# Patient Record
Sex: FEMALE | Race: WHITE | NOT HISPANIC OR LATINO
[De-identification: names, ages, dates, MRNs, and addresses within clinical notes are randomized per-mention and may not be internally consistent; named-entity substitution may affect disease eponyms.]

---

## 2017-07-20 ENCOUNTER — RESULT REVIEW (OUTPATIENT)
Age: 37
End: 2017-07-20

## 2018-07-23 ENCOUNTER — RESULT REVIEW (OUTPATIENT)
Age: 38
End: 2018-07-23

## 2018-11-24 ENCOUNTER — TRANSCRIPTION ENCOUNTER (OUTPATIENT)
Age: 38
End: 2018-11-24

## 2019-07-01 ENCOUNTER — RECORD ABSTRACTING (OUTPATIENT)
Age: 39
End: 2019-07-01

## 2019-07-01 ENCOUNTER — APPOINTMENT (OUTPATIENT)
Dept: OBGYN | Facility: CLINIC | Age: 39
End: 2019-07-01
Payer: COMMERCIAL

## 2019-07-01 VITALS
BODY MASS INDEX: 21.26 KG/M2 | WEIGHT: 120 LBS | HEIGHT: 63 IN | SYSTOLIC BLOOD PRESSURE: 104 MMHG | DIASTOLIC BLOOD PRESSURE: 62 MMHG

## 2019-07-01 DIAGNOSIS — Z80.8 FAMILY HISTORY OF MALIGNANT NEOPLASM OF OTHER ORGANS OR SYSTEMS: ICD-10-CM

## 2019-07-01 DIAGNOSIS — Z82.62 FAMILY HISTORY OF OSTEOPOROSIS: ICD-10-CM

## 2019-07-01 DIAGNOSIS — F41.9 ANXIETY DISORDER, UNSPECIFIED: ICD-10-CM

## 2019-07-01 DIAGNOSIS — Z86.32 PERSONAL HISTORY OF GESTATIONAL DIABETES: ICD-10-CM

## 2019-07-01 LAB
BILIRUB UR QL STRIP: NORMAL
CYTOLOGY CVX/VAG DOC THIN PREP: NORMAL
GLUCOSE UR-MCNC: NORMAL
HCG UR QL: 0.2 EU/DL
HCG UR QL: NEGATIVE
HGB UR QL STRIP.AUTO: NORMAL
KETONES UR-MCNC: NORMAL
LEUKOCYTE ESTERASE UR QL STRIP: NORMAL
NITRITE UR QL STRIP: NORMAL
PH UR STRIP: 5
PROT UR STRIP-MCNC: NORMAL
QUALITY CONTROL: YES
SP GR UR STRIP: 1.03

## 2019-07-01 PROCEDURE — 99213 OFFICE O/P EST LOW 20 MIN: CPT

## 2019-07-01 PROCEDURE — 81003 URINALYSIS AUTO W/O SCOPE: CPT | Mod: QW

## 2019-07-01 PROCEDURE — 81025 URINE PREGNANCY TEST: CPT

## 2019-07-01 NOTE — HISTORY OF PRESENT ILLNESS
[1 Year Ago] : 1 year ago [Good] : being in good health [Healthy Diet] : a healthy diet [Regular Exercise] : regular exercise [Last Pap ___] : Last cervical pap smear was [unfilled] [Reproductive Age] : is of reproductive age [Last Mammogram ___] : Last Mammogram was [unfilled] [Contraception] : uses contraception [Pregnancy History] : pregnancy history: [Definite:  ___ (Date)] : the last menstrual period was [unfilled] [Sexually Active] : is sexually active [Male ___] : [unfilled] male [No] : no [Weight Concerns] : no concerns with her weight [Menstrual Problems] : reports normal menses

## 2019-08-02 ENCOUNTER — TRANSCRIPTION ENCOUNTER (OUTPATIENT)
Age: 39
End: 2019-08-02

## 2019-08-26 ENCOUNTER — APPOINTMENT (OUTPATIENT)
Dept: OBGYN | Facility: CLINIC | Age: 39
End: 2019-08-26
Payer: COMMERCIAL

## 2019-08-26 VITALS
HEIGHT: 63 IN | SYSTOLIC BLOOD PRESSURE: 110 MMHG | BODY MASS INDEX: 21.26 KG/M2 | DIASTOLIC BLOOD PRESSURE: 70 MMHG | WEIGHT: 120 LBS

## 2019-08-26 DIAGNOSIS — Z80.3 FAMILY HISTORY OF MALIGNANT NEOPLASM OF BREAST: ICD-10-CM

## 2019-08-26 DIAGNOSIS — Z80.1 FAMILY HISTORY OF MALIGNANT NEOPLASM OF TRACHEA, BRONCHUS AND LUNG: ICD-10-CM

## 2019-08-26 DIAGNOSIS — Z80.0 FAMILY HISTORY OF MALIGNANT NEOPLASM OF DIGESTIVE ORGANS: ICD-10-CM

## 2019-08-26 PROCEDURE — 99395 PREV VISIT EST AGE 18-39: CPT

## 2019-08-26 NOTE — DISCUSSION/SUMMARY
[FreeTextEntry1] : 1. continue f/u with breast MD.  she will fwd records\par 2. recommended pelvic floor exercises, pelvic PT resources given\par 3. monitor menses, if become very heavy/problematic- return for further evaluation.

## 2019-08-26 NOTE — HISTORY OF PRESENT ILLNESS
[Last Pap ___] : Last cervical pap smear was [unfilled] [Definite:  ___ (Date)] : the last menstrual period was [unfilled] [Menarche Age: ____] : age at menarche was [unfilled] [Regular Cycle Intervals] : periods have been regular [Sexually Active] : is sexually active [Contraception] : uses contraception [Male ___] : [unfilled] male [Partner Vasectomy] : has a partner with a vasectomy

## 2019-08-26 NOTE — PHYSICAL EXAM
[Awake] : awake [Alert] : alert [Soft] : soft [Oriented x3] : oriented to person, place, and time [Normal] : uterus [Uterine Prolapse] : uterine prolapse [Uterine Adnexae] : were not tender and not enlarged [Mass] : no breast mass [Nipple Discharge] : no nipple discharge [Axillary LAD] : no axillary lymphadenopathy [Tender] : non tender [Distended] : not distended [Tenderness] : nontender [Enlarged ___ wks] : not enlarged [Mass ___ cm] : no uterine mass was palpated

## 2019-08-28 LAB
C TRACH RRNA SPEC QL NAA+PROBE: NOT DETECTED
HPV HIGH+LOW RISK DNA PNL CVX: NOT DETECTED
N GONORRHOEA RRNA SPEC QL NAA+PROBE: NOT DETECTED
SOURCE AMPLIFICATION: NORMAL
SOURCE TP AMPLIFICATION: NORMAL
T VAGINALIS RRNA SPEC QL NAA+PROBE: NOT DETECTED

## 2019-08-29 LAB — CYTOLOGY CVX/VAG DOC THIN PREP: ABNORMAL

## 2020-12-10 ENCOUNTER — RESULT REVIEW (OUTPATIENT)
Age: 40
End: 2020-12-10

## 2020-12-31 ENCOUNTER — APPOINTMENT (OUTPATIENT)
Dept: OBGYN | Facility: CLINIC | Age: 40
End: 2020-12-31

## 2022-04-25 ENCOUNTER — ASOB RESULT (OUTPATIENT)
Age: 42
End: 2022-04-25

## 2022-04-25 ENCOUNTER — APPOINTMENT (OUTPATIENT)
Dept: OBGYN | Facility: CLINIC | Age: 42
End: 2022-04-25
Payer: COMMERCIAL

## 2022-04-25 ENCOUNTER — APPOINTMENT (OUTPATIENT)
Dept: ANTEPARTUM | Facility: CLINIC | Age: 42
End: 2022-04-25
Payer: COMMERCIAL

## 2022-04-25 VITALS
HEIGHT: 63 IN | BODY MASS INDEX: 21.26 KG/M2 | DIASTOLIC BLOOD PRESSURE: 74 MMHG | WEIGHT: 120 LBS | SYSTOLIC BLOOD PRESSURE: 118 MMHG

## 2022-04-25 DIAGNOSIS — N90.7 VULVAR CYST: ICD-10-CM

## 2022-04-25 PROCEDURE — 99214 OFFICE O/P EST MOD 30 MIN: CPT

## 2022-04-25 PROCEDURE — 76830 TRANSVAGINAL US NON-OB: CPT

## 2022-04-29 PROBLEM — N90.7 VULVAR CYST: Status: RESOLVED | Noted: 2019-07-01 | Resolved: 2022-04-29

## 2022-04-29 NOTE — DISCUSSION/SUMMARY
[FreeTextEntry1] : We discussed the possibility of a physiologic cyst with possible intermittent ovarian torsion while she was in [place]. Reassured patient that this cyst is not suspicious of cancer in regards to both sonograms performed both in the ER and at our office.We discussed the signs and symptoms of ovarian torsion and she is aware that if she developed any symptoms she would need to go to the nearest ER. She was instructed to avoid exercise and sex.\par \par Advised patient we will repeat another transvaginal sonogram in 6 weeks. Prescription for a transvaginal sonogram given. \par \par We discussed the risks of ovarian torsion and to call the office if having severe pain. We also discussed symptoms of ovarian torsion. Patient is aware to avoid strenuous exercise and sexual intercourse for the next six weeks.\par \par All questions and concerns were discussed.\par \par She will follow up in 6 weeks for repeat pelvic sonogram and annual exam and as needed. \par \par During this visit 30 minutes were spent face-to-face with greater than 50% of the time dedicated\par to counseling.

## 2022-04-29 NOTE — HISTORY OF PRESENT ILLNESS
[N] : Patient reports normal menses [Vasectomy (partner)] : has a partner with a vasectomy [Y] : Positive pregnancy history [Menarche Age: ____] : age at menarche was [unfilled] [No] : Patient does not have concerns regarding sex [Currently Active] : currently active [PapSmeardate] : 08/26/19 [TextBox_31] : NEG [GonorrheaDate] : 08/26/19 [TextBox_63] : NEG [ChlamydiaDate] : 08/26/19 [TextBox_68] : NEG [TrichomonasDate] : 08/26/19 [TextBox_73] : NEG [HPVDate] : 08/26/19 [TextBox_78] : NEG [LMPDate] : 04/21/22 [PGHxTotal] : 4 [[facility]] : 4 [[facility]] : 4 [FreeTextEntry1] : 04/21/22

## 2022-04-29 NOTE — END OF VISIT
[FreeTextEntry3] : I, Dakotah Deutsch solely acted as scribe for Dr. Shirlene Porter on 04/25/2022 \par All medical entries made by the Scribe were at my, Dr. Porter’s, direction and personally\par dictated by me on 04/25/2022 . I have reviewed the chart and agree that the record\par accurately reflects my personal performance of the history, physical exam, assessment and plan. I\par have also personally directed, reviewed, and agreed with the chart. [Time Spent: ___ minutes] : I have spent [unfilled] minutes of time on the encounter.

## 2022-05-09 ENCOUNTER — NON-APPOINTMENT (OUTPATIENT)
Age: 42
End: 2022-05-09

## 2022-06-06 ENCOUNTER — APPOINTMENT (OUTPATIENT)
Dept: OBGYN | Facility: CLINIC | Age: 42
End: 2022-06-06

## 2022-06-06 ENCOUNTER — ASOB RESULT (OUTPATIENT)
Age: 42
End: 2022-06-06

## 2022-06-06 ENCOUNTER — APPOINTMENT (OUTPATIENT)
Dept: ANTEPARTUM | Facility: CLINIC | Age: 42
End: 2022-06-06
Payer: COMMERCIAL

## 2022-06-06 VITALS
DIASTOLIC BLOOD PRESSURE: 70 MMHG | BODY MASS INDEX: 21.79 KG/M2 | HEIGHT: 63 IN | WEIGHT: 123 LBS | SYSTOLIC BLOOD PRESSURE: 116 MMHG

## 2022-06-06 PROCEDURE — 99396 PREV VISIT EST AGE 40-64: CPT | Mod: 25

## 2022-06-06 PROCEDURE — 76830 TRANSVAGINAL US NON-OB: CPT

## 2022-06-06 PROCEDURE — 82270 OCCULT BLOOD FECES: CPT

## 2022-06-06 PROCEDURE — 57500 BIOPSY OF CERVIX: CPT

## 2022-06-06 RX ORDER — LANOLIN ALCOHOL/MO/W.PET/CERES
500 CREAM (GRAM) TOPICAL
Refills: 0 | Status: DISCONTINUED | COMMUNITY
End: 2022-06-06

## 2022-06-06 RX ORDER — CHROMIUM 200 MCG
TABLET ORAL
Refills: 0 | Status: DISCONTINUED | COMMUNITY
End: 2022-06-06

## 2022-06-06 NOTE — HISTORY OF PRESENT ILLNESS
[N] : Patient reports normal menses [Vasectomy (partner)] : has a partner with a vasectomy [Y] : Positive pregnancy history [Menarche Age: ____] : age at menarche was [unfilled] [No] : Patient does not have concerns regarding sex [Currently Active] : currently active [PapSmeardate] : 08/26/19 [TextBox_31] : NEG [GonorrheaDate] : 08/26/19 [TextBox_63] : NEG [ChlamydiaDate] : 08/26/19 [TextBox_68] : NEG [TrichomonasDate] : 08/26/19 [TextBox_73] : NEG [HPVDate] : 08/26/19 [LMPDate] : 06/06/22 [TextBox_78] : NEG [PGHxTotal] : 4 [[facility]] : 4 [[facility]] : 4 [FreeTextEntry1] : 06/06/22

## 2022-06-06 NOTE — END OF VISIT
[FreeTextEntry3] : I, Talia Wesley solely acted as a scribe on behalf of  on 06/06/2022. All medical entries made by the scribe were at my, Dr. Porter, direction and personally dictated by me on 06/06/2022 . I have reviewed the chart and agree that the record accurately reflects my personal performance of the history, physical exam, assessment and plan. I have also personally directed, reviewed and agreed with the chart.\par

## 2022-06-06 NOTE — DISCUSSION/SUMMARY
[FreeTextEntry1] : - Pap done today\par - STI testing offered and patient desires. \par - Prescription for mammogram screening and breast US given.\par - Self-breast exam reviewed. \par - Large endocervical polyp removed with ring forceps. F/U pathology.\par - Patient is aware Paraovarian cyst do not typically shrink or grow in size. Recommended repeat sono in 3 months since slightly larger in size. She is asymptomatic however if continues to enlarge or if she develops pain will consider surgical removal. F/U sono in 3 months with consult with Dr Johnathan Vanessa in the event surgery is needed.\par - Encouraged to schedule a hysteroscopy procedure secondary to menorrhagia. She will follow up for a hysteroscopy procedure within 1-2 weeks. Will premedicate with Motrin. \par \par \par All questions and concerns were discussed.\par \par \par \par  she willfolow up for sono in 3 motnhs she will make appt w diff doctor if surgery is needed most of the times does not need surgery but if it getting bigger she will need surgery. \par

## 2022-06-06 NOTE — PHYSICAL EXAM
[Chaperone Present] : A chaperone was present in the examining room during all aspects of the physical examination [Appropriately responsive] : appropriately responsive [Alert] : alert [No Acute Distress] : no acute distress [Soft] : soft [Non-tender] : non-tender [Non-distended] : non-distended [Oriented x3] : oriented x3 [Examination Of The Breasts] : a normal appearance [No Discharge] : no discharge [No Masses] : no breast masses were palpable [Labia Majora] : normal [Labia Minora] : normal [Normal] : normal [Uterine Adnexae] : normal [No Tenderness] : no tenderness [Nl Sphincter Tone] : normal sphincter tone [FreeTextEntry1] : BETI Esquivel [Polyp ___ cm] : [unfilled] ~[facility] polyp [FreeTextEntry9] : Guaiac negative

## 2022-06-07 LAB
DATE COLLECTED: NORMAL
HEMOCCULT SP1 STL QL: NEGATIVE
HPV HIGH+LOW RISK DNA PNL CVX: NOT DETECTED
QUALITY CONTROL: YES

## 2022-06-14 ENCOUNTER — APPOINTMENT (OUTPATIENT)
Dept: OBGYN | Facility: CLINIC | Age: 42
End: 2022-06-14

## 2022-06-14 VITALS
SYSTOLIC BLOOD PRESSURE: 118 MMHG | HEIGHT: 63 IN | WEIGHT: 123 LBS | DIASTOLIC BLOOD PRESSURE: 70 MMHG | BODY MASS INDEX: 21.79 KG/M2

## 2022-06-14 DIAGNOSIS — Z92.89 PERSONAL HISTORY OF OTHER MEDICAL TREATMENT: ICD-10-CM

## 2022-06-14 DIAGNOSIS — Z12.11 ENCOUNTER FOR SCREENING FOR MALIGNANT NEOPLASM OF COLON: ICD-10-CM

## 2022-06-14 DIAGNOSIS — Z01.419 ENCOUNTER FOR GYNECOLOGICAL EXAMINATION (GENERAL) (ROUTINE) W/OUT ABNORMAL FINDINGS: ICD-10-CM

## 2022-06-14 LAB
CORE LAB BIOPSY: NORMAL
CYTOLOGY CVX/VAG DOC THIN PREP: ABNORMAL
HCG UR QL: NEGATIVE
QUALITY CONTROL: YES

## 2022-06-14 PROCEDURE — 58558Z: CUSTOM

## 2022-06-14 PROCEDURE — 81025 URINE PREGNANCY TEST: CPT

## 2022-06-14 NOTE — HISTORY OF PRESENT ILLNESS
[Gonorrhea test offered] : Gonorrhea test offered [Chlamydia test offered] : Chlamydia test offered [Trichomonas test offered] : Trichomonas test offered [HPV test offered] : HPV test offered [Menarche Age: ____] : age at menarche was [unfilled] [No] : Patient does not have concerns regarding sex [Vasectomy (partner)] : has a partner with a vasectomy [Y] : Patient is sexually active [Currently Active] : currently active [TextBox_19] : 3 weeks ago; wnl per patient [PapSmeardate] : 06/06/22 [TextBox_31] : neg [GonorrheaDate] : 08/26/19 [TextBox_63] : neg [ChlamydiaDate] : 08/26/19 [TextBox_68] : neg [TrichomonasDate] : 08/26/19 [TextBox_73] : neg [HPVDate] : 06/06/22 [TextBox_78] : neg [LMPDate] : 06/06/22 [PGHxTotal] : 4 [[facility]Full[facility]] : 4 [[facility]] : 4 [FreeTextEntry1] : 06/06/22

## 2022-06-14 NOTE — REVIEW OF SYSTEMS
[Patient Intake Form Reviewed] : Patient intake form was reviewed [Incontinence] : no incontinence [Abn Vaginal bleeding] : abnormal vaginal bleeding

## 2022-06-14 NOTE — PLAN
[FreeTextEntry1] : 1) We reviewed normal cavity on hysteroscopy. Suspect benign EMB. We discussed treatment options including hormonal and ablation however she declines. Will call for results in 2 weeks.\par \par 2) Uterine prolapse to introitus. Pt asymptomatic. Recommended uro gyn consult. She will start kegel exercises although I doubt these will help much. \par \par 3) Large paraovarian cyst. She is in the process of scheduling repeat ultrasound and surgical consult with Dr Johnathan San in 3 months.\par \par

## 2022-09-07 LAB — CORE LAB BIOPSY: NORMAL

## 2022-09-08 ENCOUNTER — APPOINTMENT (OUTPATIENT)
Dept: OBGYN | Facility: CLINIC | Age: 42
End: 2022-09-08

## 2022-09-12 ENCOUNTER — NON-APPOINTMENT (OUTPATIENT)
Age: 42
End: 2022-09-12

## 2022-09-29 ENCOUNTER — APPOINTMENT (OUTPATIENT)
Dept: OBGYN | Facility: CLINIC | Age: 42
End: 2022-09-29

## 2022-09-29 ENCOUNTER — APPOINTMENT (OUTPATIENT)
Dept: ANTEPARTUM | Facility: CLINIC | Age: 42
End: 2022-09-29

## 2022-09-29 ENCOUNTER — ASOB RESULT (OUTPATIENT)
Age: 42
End: 2022-09-29

## 2022-09-29 VITALS
DIASTOLIC BLOOD PRESSURE: 76 MMHG | HEIGHT: 63 IN | SYSTOLIC BLOOD PRESSURE: 118 MMHG | WEIGHT: 123 LBS | TEMPERATURE: 97 F | BODY MASS INDEX: 21.79 KG/M2

## 2022-09-29 DIAGNOSIS — N84.1 POLYP OF CERVIX UTERI: ICD-10-CM

## 2022-09-29 PROCEDURE — 76830 TRANSVAGINAL US NON-OB: CPT

## 2022-09-29 PROCEDURE — 36415 COLL VENOUS BLD VENIPUNCTURE: CPT

## 2022-09-29 PROCEDURE — 99214 OFFICE O/P EST MOD 30 MIN: CPT

## 2022-09-30 PROBLEM — N84.1 ENDOCERVICAL POLYP: Status: RESOLVED | Noted: 2022-06-06 | Resolved: 2022-09-29

## 2022-09-30 RX ORDER — LEVOCETIRIZINE DIHYDROCHLORIDE 5 MG/1
5 TABLET, FILM COATED ORAL
Refills: 0 | Status: DISCONTINUED | COMMUNITY
End: 2022-09-29

## 2022-09-30 RX ORDER — FLUTICASONE PROPIONATE 50 UG/1
50 SPRAY, METERED NASAL
Qty: 16 | Refills: 0 | Status: DISCONTINUED | COMMUNITY
Start: 2022-05-31 | End: 2022-09-29

## 2022-09-30 NOTE — PHYSICAL EXAM
[Chaperone Present] : A chaperone was present in the examining room during all aspects of the physical examination [Appropriately responsive] : appropriately responsive [Alert] : alert [No Acute Distress] : no acute distress [Oriented x3] : oriented x3 [Labia Majora] : normal [Labia Minora] : normal [Normal] : normal [Uterine Adnexae] : normal  [FreeTextEntry4] : Grade 1 uterine prolapse noted at baseline, descent further almost but not completely down to introitus when pushing down on uterus during bimanual exam. No significant cystocele or rectocele [FreeTextEntry6] : Left adnexal fullness

## 2022-09-30 NOTE — DISCUSSION/SUMMARY
[FreeTextEntry1] : 42 y/p P4 LMP 9/14 with persistent left adnexal 6,4 cm paraovarian cyst and new 2.5 cm simple left ovarian cyst. Patient asymptomatic from these at this time, but did have acute pain from them back in April and cyst does not appear to be resolving on serial ultrasounds. Patient also with heavy menses and grade 1 uterine prolapse, but neither of these are causing her symptoms.\par - Counseling regarding ovarian cyst as above, plan to proceed with laparoscopic surgery for removal.\par - YAEL drawn today to ensure cyst is low risk for malignancy\par - Counseled on prolapse and briefly counseled on options for management such as pessary and surgery such as sacrocolpopexy and uterosacral ligament suspension, both of which could be done at the time of adnexal surgery if desired. Counseled however that in the absence of symptoms, treatment for prolapse at this time is likely not needed.\par - Referral given for urogynecology to discuss prolapse further and possibly book joint case if they feel surgical intervention is warranted, but given that I anticipate surgery will not be recommended for prolapse at this time and patient reports she is likely not going to pursue surgery for prolapse, will proceed with booking adnexal surgery. Still advised patient to see urogynecology prior just for consultation and that if needed surgery can always be postponed/rescheduled.\par - Briefly discussed heavy menses and options for hormonal management. Pt reports it is not bothersome to her and is not interested in management at this time. CBC drawn today to evaluate for anemia.\par - Will book laparoscopic LSO, right salpingectomy. Patient confirms that she is certain she is done with child bearing.\par - Task sent to surgical scheduler.\par \par Johnathan San MD

## 2022-09-30 NOTE — HISTORY OF PRESENT ILLNESS
[N] : Patient reports normal menses [Vasectomy (partner)] : has a partner with a vasectomy [Y] : Positive pregnancy history [Menarche Age: ____] : age at menarche was [unfilled] [No] : Patient does not have concerns regarding sex [Currently Active] : currently active [PapSmeardate] : 06/06/22 [TextBox_31] : WNL [HPVDate] : 06/06/22 [TextBox_78] : NEG [LMPDate] : 09/14/22 [PGHxTotal] : 4 [[facility]FullTerm] : 4 [[facility]Living] : 4 [FreeTextEntry1] : 09/14/22

## 2022-10-03 ENCOUNTER — NON-APPOINTMENT (OUTPATIENT)
Age: 42
End: 2022-10-03

## 2022-10-04 ENCOUNTER — NON-APPOINTMENT (OUTPATIENT)
Age: 42
End: 2022-10-04

## 2022-10-04 LAB
BASOPHILS # BLD AUTO: 0.03 K/UL
BASOPHILS NFR BLD AUTO: 0.4 %
EOSINOPHIL # BLD AUTO: 0.27 K/UL
EOSINOPHIL NFR BLD AUTO: 3.8 %
HCT VFR BLD CALC: 36.8 %
HGB BLD-MCNC: 11.6 G/DL
IMM GRANULOCYTES NFR BLD AUTO: 0.1 %
LYMPHOCYTES # BLD AUTO: 1.6 K/UL
LYMPHOCYTES NFR BLD AUTO: 22.8 %
MAN DIFF?: NORMAL
MCHC RBC-ENTMCNC: 29.1 PG
MCHC RBC-ENTMCNC: 31.5 GM/DL
MCV RBC AUTO: 92.2 FL
MONOCYTES # BLD AUTO: 0.44 K/UL
MONOCYTES NFR BLD AUTO: 6.3 %
NEUTROPHILS # BLD AUTO: 4.67 K/UL
NEUTROPHILS NFR BLD AUTO: 66.6 %
PLATELET # BLD AUTO: 293 K/UL
RBC # BLD: 3.99 M/UL
RBC # FLD: 12.6 %
WBC # FLD AUTO: 7.02 K/UL

## 2022-10-14 ENCOUNTER — OUTPATIENT (OUTPATIENT)
Dept: OUTPATIENT SERVICES | Facility: HOSPITAL | Age: 42
LOS: 1 days | End: 2022-10-14
Payer: COMMERCIAL

## 2022-10-14 VITALS
SYSTOLIC BLOOD PRESSURE: 118 MMHG | HEIGHT: 63 IN | HEART RATE: 82 BPM | TEMPERATURE: 98 F | DIASTOLIC BLOOD PRESSURE: 68 MMHG | WEIGHT: 125 LBS | OXYGEN SATURATION: 98 % | RESPIRATION RATE: 18 BRPM

## 2022-10-14 DIAGNOSIS — Z29.9 ENCOUNTER FOR PROPHYLACTIC MEASURES, UNSPECIFIED: ICD-10-CM

## 2022-10-14 DIAGNOSIS — N83.299 OTHER OVARIAN CYST, UNSPECIFIED SIDE: ICD-10-CM

## 2022-10-14 DIAGNOSIS — Z01.818 ENCOUNTER FOR OTHER PREPROCEDURAL EXAMINATION: ICD-10-CM

## 2022-10-14 DIAGNOSIS — N83.209 UNSPECIFIED OVARIAN CYST, UNSPECIFIED SIDE: ICD-10-CM

## 2022-10-14 DIAGNOSIS — Z98.890 OTHER SPECIFIED POSTPROCEDURAL STATES: Chronic | ICD-10-CM

## 2022-10-14 DIAGNOSIS — K08.409 PARTIAL LOSS OF TEETH, UNSPECIFIED CAUSE, UNSPECIFIED CLASS: Chronic | ICD-10-CM

## 2022-10-14 LAB
A1C WITH ESTIMATED AVERAGE GLUCOSE RESULT: 5.3 % — SIGNIFICANT CHANGE UP (ref 4–5.6)
ANION GAP SERPL CALC-SCNC: 11 MMOL/L — SIGNIFICANT CHANGE UP (ref 5–17)
APTT BLD: 34.3 SEC — SIGNIFICANT CHANGE UP (ref 27.5–35.5)
BASOPHILS # BLD AUTO: 0.01 K/UL — SIGNIFICANT CHANGE UP (ref 0–0.2)
BASOPHILS NFR BLD AUTO: 0.2 % — SIGNIFICANT CHANGE UP (ref 0–2)
BLD GP AB SCN SERPL QL: SIGNIFICANT CHANGE UP
BUN SERPL-MCNC: 13.8 MG/DL — SIGNIFICANT CHANGE UP (ref 8–20)
CALCIUM SERPL-MCNC: 9 MG/DL — SIGNIFICANT CHANGE UP (ref 8.4–10.5)
CHLORIDE SERPL-SCNC: 103 MMOL/L — SIGNIFICANT CHANGE UP (ref 98–107)
CO2 SERPL-SCNC: 26 MMOL/L — SIGNIFICANT CHANGE UP (ref 22–29)
CREAT SERPL-MCNC: 0.64 MG/DL — SIGNIFICANT CHANGE UP (ref 0.5–1.3)
EGFR: 113 ML/MIN/1.73M2 — SIGNIFICANT CHANGE UP
EOSINOPHIL # BLD AUTO: 0.19 K/UL — SIGNIFICANT CHANGE UP (ref 0–0.5)
EOSINOPHIL NFR BLD AUTO: 3.2 % — SIGNIFICANT CHANGE UP (ref 0–6)
ESTIMATED AVERAGE GLUCOSE: 105 MG/DL — SIGNIFICANT CHANGE UP (ref 68–114)
GLUCOSE SERPL-MCNC: 94 MG/DL — SIGNIFICANT CHANGE UP (ref 70–99)
HCG SERPL-ACNC: <4 MIU/ML — SIGNIFICANT CHANGE UP
HCT VFR BLD CALC: 37.6 % — SIGNIFICANT CHANGE UP (ref 34.5–45)
HGB BLD-MCNC: 12 G/DL — SIGNIFICANT CHANGE UP (ref 11.5–15.5)
IMM GRANULOCYTES NFR BLD AUTO: 0.2 % — SIGNIFICANT CHANGE UP (ref 0–0.9)
INR BLD: 1.1 RATIO — SIGNIFICANT CHANGE UP (ref 0.88–1.16)
LYMPHOCYTES # BLD AUTO: 1.36 K/UL — SIGNIFICANT CHANGE UP (ref 1–3.3)
LYMPHOCYTES # BLD AUTO: 23.2 % — SIGNIFICANT CHANGE UP (ref 13–44)
MCHC RBC-ENTMCNC: 29.3 PG — SIGNIFICANT CHANGE UP (ref 27–34)
MCHC RBC-ENTMCNC: 31.9 GM/DL — LOW (ref 32–36)
MCV RBC AUTO: 91.9 FL — SIGNIFICANT CHANGE UP (ref 80–100)
MONOCYTES # BLD AUTO: 0.42 K/UL — SIGNIFICANT CHANGE UP (ref 0–0.9)
MONOCYTES NFR BLD AUTO: 7.2 % — SIGNIFICANT CHANGE UP (ref 2–14)
NEUTROPHILS # BLD AUTO: 3.87 K/UL — SIGNIFICANT CHANGE UP (ref 1.8–7.4)
NEUTROPHILS NFR BLD AUTO: 66 % — SIGNIFICANT CHANGE UP (ref 43–77)
PLATELET # BLD AUTO: 248 K/UL — SIGNIFICANT CHANGE UP (ref 150–400)
POTASSIUM SERPL-MCNC: 4 MMOL/L — SIGNIFICANT CHANGE UP (ref 3.5–5.3)
POTASSIUM SERPL-SCNC: 4 MMOL/L — SIGNIFICANT CHANGE UP (ref 3.5–5.3)
PROTHROM AB SERPL-ACNC: 12.8 SEC — SIGNIFICANT CHANGE UP (ref 10.5–13.4)
RBC # BLD: 4.09 M/UL — SIGNIFICANT CHANGE UP (ref 3.8–5.2)
RBC # FLD: 12.6 % — SIGNIFICANT CHANGE UP (ref 10.3–14.5)
SODIUM SERPL-SCNC: 140 MMOL/L — SIGNIFICANT CHANGE UP (ref 135–145)
WBC # BLD: 5.86 K/UL — SIGNIFICANT CHANGE UP (ref 3.8–10.5)
WBC # FLD AUTO: 5.86 K/UL — SIGNIFICANT CHANGE UP (ref 3.8–10.5)

## 2022-10-14 PROCEDURE — G0463: CPT

## 2022-10-14 NOTE — H&P PST ADULT - PROBLEM SELECTOR PLAN 1
Her procedure is Laparoscopic Left Salpingo Oophorectomy, Right Salpingectomy, Possible Hysterectomy, Possible Oophorectomy, Possible Staging with MD San on 10/21/22.

## 2022-10-14 NOTE — H&P PST ADULT - NSICDXPASTMEDICALHX_GEN_ALL_CORE_FT
PAST MEDICAL HISTORY:  Anxiety     Gestational diabetes     Ovarian cyst Left    Seasonal allergies

## 2022-10-14 NOTE — H&P PST ADULT - ASSESSMENT
43 y/o female with PMH of anxiety and seasonal allergies arrives from home to PST with complaints of left side ovarian cyst. Upon assessment, abdomen is soft non tender, no abdominal distension, surgical site clear and intact. Patient educated on surgical scrub, COVID testing, preadmission instructions, and day of procedure medications, verbalizes understanding. Pt instructed to stop vitamins/supplements/herbal medications/ASA/NSAIDS for one week prior to surgery and discuss with PMD.  OPIOID RISK TOOL    ZHANNA EACH BOX THAT APPLIES AND ADD TOTALS AT THE END    FAMILY HISTORY OF SUBSTANCE ABUSE                 FEMALE         MALE                                                Alcohol                             [  ]1 pt          [  ]3pts                                               Illegal Durgs                     [  ]2 pts        [  ]3pts                                               Rx Drugs                           [  ]4 pts        [  ]4 pts    PERSONAL HISTORY OF SUBSTANCE ABUSE                                                                                          Alcohol                             [  ]3 pts       [  ]3 pts                                               Illegal Durgs                     [  ]4 pts        [  ]4 pts                                               Rx Drugs                           [  ]5 pts        [  ]5 pts    AGE BETWEEN 16-45 YEARS                                      [ x ]1 pt         [  ]1 pt    HISTORY OF PREADOLESCENT   SEXUAL ABUSE                                                             [  ]3 pts        [  ]0pts    PSYCHOLOGICAL DISEASE                     ADD, OCD, Bipolar, Schizophrenia        [  ]2 pts         [  ]2 pts                      Depression                                               [  ]1 pt           [  ]1 pt           SCORING TOTAL   (add numbers and type here)              (**1*)                                     A score of 3 or lower indicated LOW risk for future opiod abuse  A score of 4 to 7 indicated moderate risk for future opiod abuse  A score of 8 or higher indicates a high risk for opiod abuse  CAPRINI SCORE    AGE RELATED RISK FACTORS                                                             [x ] Age 41-60 years                                            (1 Point)  [ ] Age: 61-74 years                                           (2 Points)                 [ ] Age= 75 years                                                (3 Points)             DISEASE RELATED RISK FACTORS                                                       [ ] Edema in the lower extremities                 (1 Point)                     [ ] Varicose veins                                               (1 Point)                                 [ ] BMI > 25 Kg/m2                                            (1 Point)                                  [ ] Serious infection (ie PNA)                            (1 Point)                     [ ] Lung disease ( COPD, Emphysema)            (1 Point)                                                                          [ ] Acute myocardial infarction                         (1 Point)                  [ ] Congestive heart failure (in the previous month)  (1 Point)         [ ] Inflammatory bowel disease                            (1 Point)                  [ ] Central venous access, PICC or Port               (2 points)       (within the last month)                                                                [ ] Stroke (in the previous month)                        (5 Points)    [ ] Previous or present malignancy                       (2 points)                                                                                                                                                         HEMATOLOGY RELATED FACTORS                                                         [ ] Prior episodes of VTE                                     (3 Points)                     [ ] Positive family history for VTE                      (3 Points)                  [ ] Prothrombin 25672 A                                     (3 Points)                     [ ] Factor V Leiden                                                (3 Points)                        [ ] Lupus anticoagulants                                      (3 Points)                                                           [ ] Anticardiolipin antibodies                              (3 Points)                                                       [ ] High homocysteine in the blood                   (3 Points)                                             [ ] Other congenital or acquired thrombophilia      (3 Points)                                                [ ] Heparin induced thrombocytopenia                  (3 Points)                                        MOBILITY RELATED FACTORS  [ ] Bed rest                                                         (1 Point)  [ ] Plaster cast                                                    (2 points)  [ ] Bed bound for more than 72 hours           (2 Points)    GENDER SPECIFIC FACTORS  [ ] Pregnancy or had a baby within the last month   (1 Point)  [ ] Post-partum < 6 weeks                                   (1 Point)  [ ] Hormonal therapy  or oral contraception   (1 Point)  [ ] History of pregnancy complications              (1 point)  [ ] Unexplained or recurrent              (1 Point)    OTHER RISK FACTORS                                           (1 Point)  [ ] BMI >40, smoking, diabetes requiring insulin, chemotherapy  blood transfusions and length of surgery over 2 hours    SURGERY RELATED RISK FACTORS  [ ]  Section within the last month     (1 Point)  [ ] Minor surgery                                                  (1 Point)  [ ] Arthroscopic surgery                                       (2 Points)  [x ] Planned major surgery lasting more            (2 Points)      than 45 minutes     [ ] Elective hip or knee joint replacement       (5 points)       surgery                                                TRAUMA RELATED RISK FACTORS  [ ] Fracture of the hip, pelvis, or leg                       (5 Points)  [ ] Spinal cord injury resulting in paralysis             (5 points)       (in the previous month)    [ ] Paralysis  (less than 1 month)                             (5 Points)  [ ] Multiple Trauma within 1 month                        (5 Points)    Total Score [     3   ]    Caprini Score 0-2: Low Risk, NO VTE prophylaxis required for most patients, encourage ambulation  Caprini Score 3-6: Moderate Risk , pharmacologic VTE prophylaxis is indicated for most patients (in the absence of contraindications)  Caprini Score Greater than or =7: High risk, pharmocologic VTE prophylaxis indicated for most patients (in the absence of contraindications)

## 2022-10-14 NOTE — H&P PST ADULT - ATTENDING COMMENTS
Agree with above. Patient with complex left ovarian cyst and elevated HE4. Case was discussed with gyn oncology, plan to perform laparoscopic LSO and send for frozen pathology. If frozen malignant, oncology on back up for TLH-BSO, staging. If benign or indeterminant, plan for LSO, right salpingectomy and completion of procedure. All of this explained to patient again today and consents signed.    Johnathan San MD

## 2022-10-14 NOTE — H&P PST ADULT - PROBLEM SELECTOR PLAN 2
Caprini Score 3: Moderate Risk , pharmacologic VTE prophylaxis is indicated for most patients (in the absence of contraindications)

## 2022-10-14 NOTE — H&P PST ADULT - HISTORY OF PRESENT ILLNESS
Called patient to discuss YAEL results. Explained results, review with Dr. Taylor of gyn onoclogy, and plan for LSC LSO, right salpingectomy with sending of frozen pathology to r/o malignancy and gyn oncology on back in the event of malignancy. Counseled on plan for LSO and right salpingectomy and that in the event of benign frozen or indeterminate frozen this will the the total extend of the surgery and we will await permanent pathology for final results. Advised that if frozen is concerning for malignancy she will have TLH-BSO and staging by gyn oncology at the time of this surgery. Lastly advised that if frozen is benign or indeterminant but final pathology returns as malignant, she will likely need a second surgery for completion, though the purpose of frozen pathology is to try and catch a malignancy intra-op so she is not subjected to a second procedure and does not have a delay in surgery.    Counseled that cyst is most likely benign or at worst borderline (which still only needs USO), so likelihood of staging procedure at time of her surgery is very low, but we want to have the right team and resources in place in case of the small chance it turns out to be a malignancy so she can get the care she needs promptly. Patient expressed understanding, all questions answered. Task sent to surgical scheduler to work on booking case in the next few weeks.      YAEL returned elevated risk, primarily due to elevated HE4 at 69. Discussed case with Dr. Taylor of gyn oncology and reviewed images with her. Possibly a borderline, but appears largely low risk. Jointly formed plan to continue with me performing USO, but now will send for frozen pathology and have gyn oncology on standby for staging if frozen returns malignant. Attempted to call patient to discuss plan today, but got voicemail on two occasions. Will attempt again tomorrow.     Called patient to discuss YAEL results. Explained results, review with Dr. Taylor of gyn onoclogy, and plan for LSC LSO, right salpingectomy with sending of frozen pathology to r/o malignancy and gyn oncology on back in the event of malignancy. Counseled on plan for LSO and right salpingectomy and that in the event of benign frozen or indeterminate frozen this will the the total extend of the surgery and we will await permanent pathology for final results. Advised that if frozen is concerning for malignancy she will have TLH-BSO and staging by gyn oncology at the time of this surgery. Lastly advised that if frozen is benign or indeterminant but final pathology returns as malignant, she will likely need a second surgery for completion, though the purpose of frozen pathology is to try and catch a malignancy intra-op so she is not subjected to a second procedure and does not have a delay in surgery.    Counseled that cyst is most likely benign or at worst borderline (which still only needs USO), so likelihood of staging procedure at time of her surgery is very low, but we want to have the right team and resources in place in case of the small chance it turns out to be a malignancy so she can get the care she needs promptly. Patient expressed understanding, all questions answered. Task sent to surgical scheduler to work on booking case in the next few weeks.      YAEL returned elevated risk, primarily due to elevated HE4 at 69. Discussed case with Dr. Taylor of gyn oncology and reviewed images with her. Possibly a borderline, but appears largely low risk. Jointly formed plan to continue with me performing USO, but now will send for frozen pathology and have gyn oncology on standby for staging if frozen returns malignant. Attempted to call patient to discuss plan today, but got voicemail on two occasions. Will attempt again tomorrow.      hystoscopy due to increase clotts and polps     april left cyst went to ED in [place]  pain went away, suspected torsion   9mm to 6mm  no pain  10/13   43 y/o female with PMH of anxiety and seasonal allergies arrives from home to PST with complaints of left side ovarian cyst. In April 2022 she had 10/10 left side abdominal pain that she went to the ED for (in [place]?). She was told at the time the ultrasound found a cyst on her ovary, but she was probably having a torsion that corrected itself, and to follow up with her GYN. He cyst size has gone from a 9mm in size to a 6mm in size and she has denies any pain since.  She was told that cyst is most likely benign or at worst borderline (which still only needs USO), so likelihood of staging procedure at time of her surgery is very low. YAEL returned elevated risk, primarily due to elevated HE4 at 69. LMP 10/13/22. Her procedure is Laparoscopic Left Salpingo Oophorectomy, Right Salpingectomy, Possible Hysterectomy, Possible Oophorectomy, Possible Staging with MD San on 10/21/22.

## 2022-10-17 PROBLEM — O24.419 GESTATIONAL DIABETES MELLITUS IN PREGNANCY, UNSPECIFIED CONTROL: Chronic | Status: ACTIVE | Noted: 2022-10-14

## 2022-10-17 PROBLEM — F41.9 ANXIETY DISORDER, UNSPECIFIED: Chronic | Status: ACTIVE | Noted: 2022-10-14

## 2022-10-17 PROBLEM — N83.209 UNSPECIFIED OVARIAN CYST, UNSPECIFIED SIDE: Chronic | Status: ACTIVE | Noted: 2022-10-14

## 2022-10-17 PROBLEM — J30.2 OTHER SEASONAL ALLERGIC RHINITIS: Chronic | Status: ACTIVE | Noted: 2022-10-14

## 2022-10-20 ENCOUNTER — TRANSCRIPTION ENCOUNTER (OUTPATIENT)
Age: 42
End: 2022-10-20

## 2022-10-21 ENCOUNTER — TRANSCRIPTION ENCOUNTER (OUTPATIENT)
Age: 42
End: 2022-10-21

## 2022-10-21 ENCOUNTER — OUTPATIENT (OUTPATIENT)
Dept: OUTPATIENT SERVICES | Facility: HOSPITAL | Age: 42
LOS: 1 days | End: 2022-10-21
Payer: COMMERCIAL

## 2022-10-21 ENCOUNTER — APPOINTMENT (OUTPATIENT)
Dept: OBGYN | Facility: HOSPITAL | Age: 42
End: 2022-10-21

## 2022-10-21 ENCOUNTER — RESULT REVIEW (OUTPATIENT)
Age: 42
End: 2022-10-21

## 2022-10-21 VITALS
RESPIRATION RATE: 16 BRPM | SYSTOLIC BLOOD PRESSURE: 140 MMHG | OXYGEN SATURATION: 99 % | HEIGHT: 62.99 IN | HEART RATE: 81 BPM | WEIGHT: 125.22 LBS | DIASTOLIC BLOOD PRESSURE: 87 MMHG | TEMPERATURE: 99 F

## 2022-10-21 VITALS
OXYGEN SATURATION: 100 % | HEART RATE: 67 BPM | RESPIRATION RATE: 16 BRPM | DIASTOLIC BLOOD PRESSURE: 65 MMHG | SYSTOLIC BLOOD PRESSURE: 121 MMHG

## 2022-10-21 DIAGNOSIS — Z98.890 OTHER SPECIFIED POSTPROCEDURAL STATES: Chronic | ICD-10-CM

## 2022-10-21 DIAGNOSIS — N83.299 OTHER OVARIAN CYST, UNSPECIFIED SIDE: ICD-10-CM

## 2022-10-21 DIAGNOSIS — K08.409 PARTIAL LOSS OF TEETH, UNSPECIFIED CAUSE, UNSPECIFIED CLASS: Chronic | ICD-10-CM

## 2022-10-21 LAB
ABO RH CONFIRMATION: SIGNIFICANT CHANGE UP
GLUCOSE BLDC GLUCOMTR-MCNC: 101 MG/DL — HIGH (ref 70–99)
HCG SERPL-ACNC: <4 MIU/ML — SIGNIFICANT CHANGE UP

## 2022-10-21 PROCEDURE — 88302 TISSUE EXAM BY PATHOLOGIST: CPT | Mod: 26

## 2022-10-21 PROCEDURE — 82962 GLUCOSE BLOOD TEST: CPT

## 2022-10-21 PROCEDURE — 84702 CHORIONIC GONADOTROPIN TEST: CPT

## 2022-10-21 PROCEDURE — C9399: CPT

## 2022-10-21 PROCEDURE — 36415 COLL VENOUS BLD VENIPUNCTURE: CPT

## 2022-10-21 PROCEDURE — 88302 TISSUE EXAM BY PATHOLOGIST: CPT

## 2022-10-21 PROCEDURE — 88305 TISSUE EXAM BY PATHOLOGIST: CPT | Mod: 26

## 2022-10-21 PROCEDURE — 58661 LAPAROSCOPY REMOVE ADNEXA: CPT | Mod: 50

## 2022-10-21 PROCEDURE — 88112 CYTOPATH CELL ENHANCE TECH: CPT | Mod: 26

## 2022-10-21 PROCEDURE — 88305 TISSUE EXAM BY PATHOLOGIST: CPT

## 2022-10-21 PROCEDURE — 88112 CYTOPATH CELL ENHANCE TECH: CPT

## 2022-10-21 PROCEDURE — 58661 LAPAROSCOPY REMOVE ADNEXA: CPT

## 2022-10-21 RX ORDER — OXYCODONE HYDROCHLORIDE 5 MG/1
1 TABLET ORAL
Qty: 6 | Refills: 0
Start: 2022-10-21

## 2022-10-21 RX ORDER — LEVOCETIRIZINE DIHYDROCHLORIDE 0.5 MG/ML
1 SOLUTION ORAL
Qty: 0 | Refills: 0 | DISCHARGE

## 2022-10-21 RX ORDER — CELECOXIB 200 MG/1
400 CAPSULE ORAL ONCE
Refills: 0 | Status: COMPLETED | OUTPATIENT
Start: 2022-10-21 | End: 2022-10-21

## 2022-10-21 RX ORDER — VANCOMYCIN HCL 1 G
750 VIAL (EA) INTRAVENOUS ONCE
Refills: 0 | Status: DISCONTINUED | OUTPATIENT
Start: 2022-10-21 | End: 2022-10-21

## 2022-10-21 RX ORDER — ACETAMINOPHEN 500 MG
975 TABLET ORAL ONCE
Refills: 0 | Status: COMPLETED | OUTPATIENT
Start: 2022-10-21 | End: 2022-10-21

## 2022-10-21 RX ORDER — FLUTICASONE PROPIONATE 50 MCG
1 SPRAY, SUSPENSION NASAL
Qty: 0 | Refills: 0 | DISCHARGE

## 2022-10-21 RX ORDER — SODIUM CHLORIDE 9 MG/ML
3 INJECTION INTRAMUSCULAR; INTRAVENOUS; SUBCUTANEOUS ONCE
Refills: 0 | Status: DISCONTINUED | OUTPATIENT
Start: 2022-10-21 | End: 2022-10-21

## 2022-10-21 RX ORDER — GENTAMICIN SULFATE 40 MG/ML
260 VIAL (ML) INJECTION ONCE
Refills: 0 | Status: DISCONTINUED | OUTPATIENT
Start: 2022-10-21 | End: 2022-10-21

## 2022-10-21 RX ORDER — SODIUM CHLORIDE 9 MG/ML
1000 INJECTION, SOLUTION INTRAVENOUS
Refills: 0 | Status: DISCONTINUED | OUTPATIENT
Start: 2022-10-21 | End: 2022-10-21

## 2022-10-21 RX ORDER — ONDANSETRON 8 MG/1
4 TABLET, FILM COATED ORAL ONCE
Refills: 0 | Status: COMPLETED | OUTPATIENT
Start: 2022-10-21 | End: 2022-10-21

## 2022-10-21 RX ORDER — FENTANYL CITRATE 50 UG/ML
25 INJECTION INTRAVENOUS
Refills: 0 | Status: DISCONTINUED | OUTPATIENT
Start: 2022-10-21 | End: 2022-10-21

## 2022-10-21 RX ORDER — ONDANSETRON 8 MG/1
4 TABLET, FILM COATED ORAL ONCE
Refills: 0 | Status: DISCONTINUED | OUTPATIENT
Start: 2022-10-21 | End: 2022-11-04

## 2022-10-21 RX ORDER — ESCITALOPRAM OXALATE 10 MG/1
1 TABLET, FILM COATED ORAL
Qty: 0 | Refills: 0 | DISCHARGE

## 2022-10-21 RX ADMIN — ONDANSETRON 4 MILLIGRAM(S): 8 TABLET, FILM COATED ORAL at 15:15

## 2022-10-21 RX ADMIN — CELECOXIB 400 MILLIGRAM(S): 200 CAPSULE ORAL at 08:44

## 2022-10-21 RX ADMIN — Medication 975 MILLIGRAM(S): at 08:44

## 2022-10-21 NOTE — ASU DISCHARGE PLAN (ADULT/PEDIATRIC) - NOTHING PER VAGINA DURATION
[FreeTextEntry1] : Unable to check vitals due to limitations of telemedicine [General Appearance - Alert] : alert [General Appearance - Well Nourished] : well nourished [General Appearance - In No Acute Distress] : in no acute distress [General Appearance - Well-Appearing] : healthy appearing [Oriented To Time, Place, And Person] : oriented to person, place, and time [General Appearance - Well Developed] : well developed [Affect] : the affect was normal [Impaired Insight] : insight and judgment were intact [Mood] : the mood was normal [Person] : oriented to person [Short Term Intact] : short term memory intact [Place] : oriented to place [Remote Intact] : remote memory intact [Span Intact] : the attention span was normal [Concentration Intact] : normal concentrating ability [Naming Objects] : no difficulty naming common objects [Repeating Phrases] : no difficulty repeating a phrase [Fluency] : fluency intact [Past History] : adequate knowledge of personal past history [Current Events] : adequate knowledge of current events [Comprehension] : comprehension intact [Cranial Nerves Trigeminal (V)] : facial sensation intact symmetrically [Cranial Nerves Optic (II)] : visual acuity intact bilaterally,  visual fields full to confrontation, pupils equal round and reactive to light [Cranial Nerves Oculomotor (III)] : extraocular motion intact [Cranial Nerves Vestibulocochlear (VIII)] : hearing was intact bilaterally [Cranial Nerves Facial (VII)] : face symmetrical [Cranial Nerves Glossopharyngeal (IX)] : tongue and palate midline [Cranial Nerves Accessory (XI - Cranial And Spinal)] : head turning and shoulder shrug symmetric [Motor Strength] : muscle strength was normal in all four extremities 2 weeks [Motor Tone] : muscle tone was normal in all four extremities [Cranial Nerves Hypoglossal (XII)] : there was no tongue deviation with protrusion [No Muscle Atrophy] : normal bulk in all four extremities [Paresis Pronator Drift Right-Sided] : no pronator drift on the right [Involuntary Movements] : no involuntary movements were seen [Motor Strength Lower Extremities Bilaterally] : strength was normal in both lower extremities [Motor Strength Upper Extremities Bilaterally] : strength was normal in both upper extremities [Paresis Pronator Drift Left-Sided] : no pronator drift on the left [FreeTextEntry9] : unable to test over telemedicine [Sensation Tactile Decrease] : light touch was intact [Sclera] : the sclera and conjunctiva were normal [Extraocular Movements] : extraocular movements were intact [Outer Ear] : the ears and nose were normal in appearance [Neck Appearance] : the appearance of the neck was normal [] : no respiratory distress [Exaggerated Use Of Accessory Muscles For Inspiration] : no accessory muscle use

## 2022-10-21 NOTE — BRIEF OPERATIVE NOTE - NSICDXBRIEFPROCEDURE_GEN_ALL_CORE_FT
PROCEDURES:  Salpingectomy, bilateral, total, laparoscopic 21-Oct-2022 14:01:02  Melanie Rosa  Laparoscopic oophorectomy, left 21-Oct-2022 14:01:14  Melanie Rosa

## 2022-10-21 NOTE — BRIEF OPERATIVE NOTE - OPERATION/FINDINGS
left paratubal cyst about 7cm in size. Grossly normal uterus, bilateral fallopian tubes and bilateral ovaries left paratubal cyst about 7cm in size. Grossly normal uterus, bilateral fallopian tubes and bilateral ovaries. Small amount of filmy adhesions of large bowel to right anterior abdominal wall Left paratubal cyst about 7cm in size. Grossly normal uterus, bilateral fallopian tubes and bilateral ovaries. Small amount of filmy adhesions of mid-portion of ascending colon to right anterior abdominal wall superior to pelvic brim. Normal appendix, normal upper abdomen.

## 2022-10-21 NOTE — BRIEF OPERATIVE NOTE - COMMENTS
Left adnexa sent for frozen- benign appearing  Dictation number: Left adnexa sent for frozen- benign appearing  Dictation number: [id number]

## 2022-10-21 NOTE — ASU DISCHARGE PLAN (ADULT/PEDIATRIC) - CARE PROVIDER_API CALL
Johnathan San  OBSGYN  [id number]  [place] [address]  Phone: [phone]  Fax: [phone]  Follow Up Time: 2 weeks

## 2022-10-23 ENCOUNTER — NON-APPOINTMENT (OUTPATIENT)
Age: 42
End: 2022-10-23

## 2022-10-24 LAB
CA 125 (LABCORP): 20.1 U/ML
HE4X: 69 PMOL/L
POSTMENOPAUSAL ROMA: 1.84
PREMENOPAUSAL ROMA: 1.5
ROMA COMMENT: NORMAL

## 2022-10-26 ENCOUNTER — NON-APPOINTMENT (OUTPATIENT)
Age: 42
End: 2022-10-26

## 2022-10-26 LAB
NON-GYNECOLOGICAL CYTOLOGY STUDY: SIGNIFICANT CHANGE UP
SURGICAL PATHOLOGY STUDY: SIGNIFICANT CHANGE UP

## 2022-11-03 ENCOUNTER — APPOINTMENT (OUTPATIENT)
Dept: OBGYN | Facility: CLINIC | Age: 42
End: 2022-11-03

## 2022-11-03 VITALS
DIASTOLIC BLOOD PRESSURE: 70 MMHG | BODY MASS INDEX: 21.79 KG/M2 | HEIGHT: 63 IN | WEIGHT: 123 LBS | SYSTOLIC BLOOD PRESSURE: 120 MMHG

## 2022-11-03 PROCEDURE — 99024 POSTOP FOLLOW-UP VISIT: CPT

## 2022-11-03 RX ORDER — BETAMETHASONE DIPROPIONATE 0.5 MG/G
0.05 CREAM TOPICAL
Qty: 15 | Refills: 0 | Status: DISCONTINUED | COMMUNITY
Start: 2022-09-17

## 2022-11-03 RX ORDER — PIMECROLIMUS 10 MG/G
1 CREAM TOPICAL
Qty: 60 | Refills: 0 | Status: DISCONTINUED | COMMUNITY
Start: 2022-09-19

## 2022-11-03 RX ORDER — ESCITALOPRAM OXALATE 10 MG/1
10 TABLET ORAL
Qty: 90 | Refills: 0 | Status: ACTIVE | COMMUNITY
Start: 2022-07-05

## 2022-11-03 RX ORDER — COVID-19 MOLECULAR TEST ASSAY
KIT MISCELLANEOUS
Qty: 8 | Refills: 0 | Status: DISCONTINUED | COMMUNITY
Start: 2022-08-24

## 2022-11-03 RX ORDER — ESCITALOPRAM OXALATE 5 MG/1
5 TABLET ORAL
Qty: 90 | Refills: 0 | Status: DISCONTINUED | COMMUNITY
Start: 2022-05-31 | End: 2022-11-03

## 2022-11-03 RX ORDER — OXYCODONE 5 MG/1
5 TABLET ORAL
Qty: 6 | Refills: 0 | Status: DISCONTINUED | COMMUNITY
Start: 2022-10-25

## 2022-11-03 NOTE — HISTORY OF PRESENT ILLNESS
[Pain is well-controlled] : pain is well-controlled [Fever] : no fever [Chills] : no chills [Nausea] : no nausea [Vomiting] : no vomiting [Clean/Dry/Intact] : clean, dry and intact [Erythema] : not erythematous [Healed] : healed [Pathology reviewed] : pathology reviewed [de-identified] : 13 [de-identified] : Left salpingo-oophorectomy Right salpingectomy [de-identified] : s/p LSC LSO, right salpingectomy on 10/21/22 presenting for post-op visit today. No complaints.

## 2022-11-03 NOTE — PLAN
[FreeTextEntry1] : s/p LSC LSO, right salpingectomy on 10/21/22 recovering well\par - Pathology reviewed and benign\par - Can return to all normal activity\par - Routine visits; follow up with Urogyn only if prolapse ever becomes symptomatic\par \par Johnathan San MD

## 2023-03-29 ENCOUNTER — NON-APPOINTMENT (OUTPATIENT)
Age: 43
End: 2023-03-29

## 2023-04-06 ENCOUNTER — APPOINTMENT (OUTPATIENT)
Dept: OBGYN | Facility: CLINIC | Age: 43
End: 2023-04-06
Payer: COMMERCIAL

## 2023-04-06 ENCOUNTER — ASOB RESULT (OUTPATIENT)
Age: 43
End: 2023-04-06

## 2023-04-06 ENCOUNTER — APPOINTMENT (OUTPATIENT)
Dept: ANTEPARTUM | Facility: CLINIC | Age: 43
End: 2023-04-06
Payer: COMMERCIAL

## 2023-04-06 VITALS
DIASTOLIC BLOOD PRESSURE: 68 MMHG | HEIGHT: 63 IN | WEIGHT: 130 LBS | SYSTOLIC BLOOD PRESSURE: 116 MMHG | BODY MASS INDEX: 23.04 KG/M2

## 2023-04-06 PROCEDURE — 76830 TRANSVAGINAL US NON-OB: CPT

## 2023-04-06 PROCEDURE — 99213 OFFICE O/P EST LOW 20 MIN: CPT

## 2023-04-06 PROCEDURE — 36415 COLL VENOUS BLD VENIPUNCTURE: CPT

## 2023-04-07 NOTE — DISCUSSION/SUMMARY
[FreeTextEntry1] : 42 y/o P4 s/p LSC left oophorectomy, b/l salpingectomy on 10/21/22 presenting with missed menses, ultrasound today with two right hemorrhagic ovarian cysts, as well as thickened endometrium.\par - Counseled on ultrasound findings, advised possible that thickened endometrium could represent a polyp which may explain bleeding. This would require further evaluation.\par - FSH, E2, TSH, Prolactin drawn today for missed menses\par - Recommended diagnostic hysteroscopy with possible polypectomy or EMB, likely with hysteroscopic morcellator, in 1w. Patient in agreement with plan.\par - Repeat sonogram for cysts in 6w\par - Bartholin's cyst small and does not require intervention at this time, continue to monitor\par \par Johnathan San MD

## 2023-04-07 NOTE — HISTORY OF PRESENT ILLNESS
[HPV test offered] : HPV test offered [Vasectomy (partner)] : has a partner with a vasectomy [Y] : Positive pregnancy history [Menarche Age: ____] : age at menarche was [unfilled] [No] : Patient does not have concerns regarding sex [PapSmeardate] : 6/6/22 [TextBox_31] : neg [HPVDate] : 6/6/22 [TextBox_78] : neg [LMPDate] : 2/25/23 [[facility]] : 4 [PGHxTotal] : 4 [[facility]] : 4 [FreeTextEntry1] : 2/25/23

## 2023-04-07 NOTE — PHYSICAL EXAM
[Chaperone Present] : A chaperone was present in the examining room during all aspects of the physical examination [Appropriately responsive] : appropriately responsive [Alert] : alert [No Acute Distress] : no acute distress [Oriented x3] : oriented x3 [Labia Minora] : normal [Labia Majora] : normal [No Bleeding] : There was no active vaginal bleeding [Normal] : normal [Uterine Adnexae] : normal [FreeTextEntry4] : 5 mm left Bartholin's cyst, non-tender.

## 2023-04-14 ENCOUNTER — APPOINTMENT (OUTPATIENT)
Dept: OBGYN | Facility: CLINIC | Age: 43
End: 2023-04-14
Payer: COMMERCIAL

## 2023-04-14 VITALS
DIASTOLIC BLOOD PRESSURE: 68 MMHG | HEIGHT: 63 IN | WEIGHT: 130 LBS | SYSTOLIC BLOOD PRESSURE: 106 MMHG | BODY MASS INDEX: 23.04 KG/M2

## 2023-04-14 PROCEDURE — 81025 URINE PREGNANCY TEST: CPT

## 2023-04-14 PROCEDURE — 58558Z: CUSTOM

## 2023-04-14 NOTE — PLAN
[FreeTextEntry1] : 42 y/o P4 s/p LSC left oophorectomy, b/l salpingectomy on 10/21/22 presenting for hysteroscopy to evaluate for possible polyp, only proliferative tissue found.\par - FSH, E2, TSH, Prolactin all wnl\par - f/u pathology for endometrial sampling\par - Repeat sonogram for cysts in 5w\par - Follow up bleeding pattern at next visit, no obvious cause at this time and no intervention for now, may improve spontaneously.\par \par Johnathan San MD.

## 2023-04-17 LAB
ESTRADIOL SERPL-MCNC: 125 PG/ML
FSH SERPL-MCNC: 4 IU/L
HCG UR QL: NEGATIVE
PROLACTIN SERPL-MCNC: 12.1 NG/ML
QUALITY CONTROL: YES
TSH SERPL-ACNC: 1.18 UIU/ML

## 2023-04-26 LAB — CORE LAB BIOPSY: NORMAL

## 2023-05-19 ENCOUNTER — APPOINTMENT (OUTPATIENT)
Dept: ANTEPARTUM | Facility: CLINIC | Age: 43
End: 2023-05-19
Payer: COMMERCIAL

## 2023-05-19 ENCOUNTER — APPOINTMENT (OUTPATIENT)
Dept: OBGYN | Facility: CLINIC | Age: 43
End: 2023-05-19
Payer: COMMERCIAL

## 2023-05-19 ENCOUNTER — ASOB RESULT (OUTPATIENT)
Age: 43
End: 2023-05-19

## 2023-05-19 PROCEDURE — 99213 OFFICE O/P EST LOW 20 MIN: CPT

## 2023-05-19 PROCEDURE — 76830 TRANSVAGINAL US NON-OB: CPT

## 2023-05-20 NOTE — DISCUSSION/SUMMARY
[FreeTextEntry1] : 42 y/o P4 s/p LSC left oophorectomy, b/l salpingectomy on 10/21/22 with irregular menses, benign EMB on 4/14, menses late this month again, also with 2.9 cm right ovarian cyst. No explanation for irregular menses up to this point, but no concerning cause either.\par - Counseled on option of hormonal management to regular menses vs continuing to observe. Pt wishes to continue observation for now\par - Plan to repeat sono in 3 months to re-evaluate ovarian cyst, but patient due for annual next month, so will return for annual in ~2 months to complete both this and sono.\par - Return for annual or sooner if any issues arise.\par \par Johnathan San MD

## 2023-05-20 NOTE — HISTORY OF PRESENT ILLNESS
[N] : Patient reports normal menses [Vasectomy (partner)] : has a partner with a vasectomy [Y] : Positive pregnancy history [Menarche Age: ____] : age at menarche was [unfilled] [No] : Patient does not have concerns regarding sex [Currently Active] : currently active [PapSmeardate] : 06/06/22 [TextBox_31] : NEG [HPVDate] : 06/06/22 [TextBox_78] : NEG [LMPDate] : 04/15/23 [[facility]] : 4 [PGHxTotal] : 4 [[facility]] : 4 [FreeTextEntry1] : 04/15/23

## 2023-08-07 ENCOUNTER — APPOINTMENT (OUTPATIENT)
Dept: ANTEPARTUM | Facility: CLINIC | Age: 43
End: 2023-08-07
Payer: COMMERCIAL

## 2023-08-07 ENCOUNTER — NON-APPOINTMENT (OUTPATIENT)
Age: 43
End: 2023-08-07

## 2023-08-07 ENCOUNTER — APPOINTMENT (OUTPATIENT)
Dept: OBGYN | Facility: CLINIC | Age: 43
End: 2023-08-07
Payer: COMMERCIAL

## 2023-08-07 ENCOUNTER — ASOB RESULT (OUTPATIENT)
Age: 43
End: 2023-08-07

## 2023-08-07 VITALS
DIASTOLIC BLOOD PRESSURE: 62 MMHG | HEIGHT: 63 IN | WEIGHT: 130 LBS | SYSTOLIC BLOOD PRESSURE: 106 MMHG | BODY MASS INDEX: 23.04 KG/M2

## 2023-08-07 DIAGNOSIS — R92.2 INCONCLUSIVE MAMMOGRAM: ICD-10-CM

## 2023-08-07 DIAGNOSIS — N81.4 UTEROVAGINAL PROLAPSE, UNSPECIFIED: ICD-10-CM

## 2023-08-07 DIAGNOSIS — Z01.419 ENCOUNTER FOR GYNECOLOGICAL EXAMINATION (GENERAL) (ROUTINE) W/OUT ABNORMAL FINDINGS: ICD-10-CM

## 2023-08-07 DIAGNOSIS — Z87.42 PERSONAL HISTORY OF OTHER DISEASES OF THE FEMALE GENITAL TRACT: ICD-10-CM

## 2023-08-07 DIAGNOSIS — N83.299 OTHER OVARIAN CYST, UNSPECIFIED SIDE: ICD-10-CM

## 2023-08-07 LAB
BILIRUB UR QL STRIP: NORMAL
GLUCOSE UR-MCNC: NORMAL
HCG UR QL: 0.2 EU/DL
HGB UR QL STRIP.AUTO: NORMAL
KETONES UR-MCNC: NORMAL
LEUKOCYTE ESTERASE UR QL STRIP: NORMAL
NITRITE UR QL STRIP: NORMAL
PH UR STRIP: 5.5
PROT UR STRIP-MCNC: NORMAL
SP GR UR STRIP: 1.02

## 2023-08-07 PROCEDURE — 81003 URINALYSIS AUTO W/O SCOPE: CPT | Mod: QW

## 2023-08-07 PROCEDURE — 99396 PREV VISIT EST AGE 40-64: CPT

## 2023-08-07 PROCEDURE — 76830 TRANSVAGINAL US NON-OB: CPT

## 2023-08-07 NOTE — DISCUSSION/SUMMARY
[FreeTextEntry1] : 42 y/o P4 LMP 7/19 with hx of LSC LSO, R. salpingectomy for ovarian cyst, as well as recent irregular menses s/p normal EMB in April presenting for annual visit today. Menses now normal, no cyst on sono today. No complaints today. - Reviewed grade 1 prolapse again, advised if not symptomatic no intervention needed - Pap/HPV NIL and negative 06/2022, due in 2027, reviewed ASCCP guidelines with patient. Normal exam today - Mammogram and breast sono (dense breasts) ordered - No further follow up of ovarian cysts needed - Return for annual or sooner if needed  Johnathan San MD

## 2023-08-07 NOTE — PHYSICAL EXAM
[Chaperone Present] : A chaperone was present in the examining room during all aspects of the physical examination [Appropriately responsive] : appropriately responsive [Alert] : alert [No Acute Distress] : no acute distress [Oriented x3] : oriented x3 [Examination Of The Breasts] : a normal appearance [No Masses] : no breast masses were palpable [Labia Majora] : normal [Labia Minora] : normal [Normal] : normal [Uterine Adnexae] : absent [FreeTextEntry4] : Grade 1 uterine prolapse noted at baseline, descent further almost but not completely down to introitus when pushing down on uterus during bimanual exam. No significant cystocele or rectocele.

## 2023-08-07 NOTE — HISTORY OF PRESENT ILLNESS
[N] : Patient reports normal menses [Vasectomy (partner)] : has a partner with a vasectomy [Y] : Positive pregnancy history [Menarche Age: ____] : age at menarche was [unfilled] [Currently Active] : currently active [PapSmeardate] : 06/06/22 [TextBox_31] : WNL [GonorrheaDate] : 08/26/19 [TextBox_63] : NEG [ChlamydiaDate] : 08/26/19 [TextBox_68] : NEG [HPVDate] : 06/06/22 [TextBox_78] : NEG [LMPDate] : 07/19/23 [PGHxTotal] : 4 [[facility]] : 4 [[facility]] : 4 [FreeTextEntry1] : 07/19/23

## 2023-08-08 ENCOUNTER — OFFICE (OUTPATIENT)
Dept: URBAN - METROPOLITAN AREA CLINIC 12 | Facility: CLINIC | Age: 43
Setting detail: OPHTHALMOLOGY
End: 2023-08-08
Payer: COMMERCIAL

## 2023-08-08 DIAGNOSIS — H52.03: ICD-10-CM

## 2023-08-08 DIAGNOSIS — G43.109: ICD-10-CM

## 2023-08-08 DIAGNOSIS — H16.223: ICD-10-CM

## 2023-08-08 DIAGNOSIS — H01.004: ICD-10-CM

## 2023-08-08 DIAGNOSIS — H01.001: ICD-10-CM

## 2023-08-08 DIAGNOSIS — H40.013: ICD-10-CM

## 2023-08-08 DIAGNOSIS — H17.9: ICD-10-CM

## 2023-08-08 PROCEDURE — 92015 DETERMINE REFRACTIVE STATE: CPT | Performed by: STUDENT IN AN ORGANIZED HEALTH CARE EDUCATION/TRAINING PROGRAM

## 2023-08-08 PROCEDURE — 92020 GONIOSCOPY: CPT | Performed by: STUDENT IN AN ORGANIZED HEALTH CARE EDUCATION/TRAINING PROGRAM

## 2023-08-08 PROCEDURE — 92250 FUNDUS PHOTOGRAPHY W/I&R: CPT | Performed by: STUDENT IN AN ORGANIZED HEALTH CARE EDUCATION/TRAINING PROGRAM

## 2023-08-08 PROCEDURE — 92083 EXTENDED VISUAL FIELD XM: CPT | Performed by: STUDENT IN AN ORGANIZED HEALTH CARE EDUCATION/TRAINING PROGRAM

## 2023-08-08 PROCEDURE — 92014 COMPRE OPH EXAM EST PT 1/>: CPT | Performed by: STUDENT IN AN ORGANIZED HEALTH CARE EDUCATION/TRAINING PROGRAM

## 2023-08-08 ASSESSMENT — REFRACTION_MANIFEST
OS_SPHERE: +1.00
OS_SPHERE: +1.00
OS_AXIS: 080
OS_AXIS: 085
OS_VA1: 20/20
OD_AXIS: 100
OS_CYLINDER: -1.25
OS_SPHERE: +1.25
OS_VA1: 20/20
OD_VA1: 20/20
OS_AXIS: 085
OD_CYLINDER: -1.00
OD_SPHERE: +1.25
OS_CYLINDER: -0.75
OS_CYLINDER: -1.25
OS_AXIS: 085
OD_AXIS: 100
OD_SPHERE: +1.00
OD_CYLINDER: -1.50
OD_CYLINDER: -1.50
OS_CYLINDER: -1.25
OD_AXIS: 105
OD_SPHERE: +0.75
OD_VA1: 20/20
OD_VA1: 20/20
OS_VA1: 20/20
OD_VA1: 20/20
OS_VA1: 20/20
OD_AXIS: 100
OS_SPHERE: +0.50
OD_SPHERE: +1.25
OD_CYLINDER: -1.75

## 2023-08-08 ASSESSMENT — LID EXAM ASSESSMENTS
OS_BLEPHARITIS: LUL 1+
OD_BLEPHARITIS: RUL 1+

## 2023-08-08 ASSESSMENT — SPHEQUIV_DERIVED
OS_SPHEQUIV: 0.625
OD_SPHEQUIV: 0.25
OS_SPHEQUIV: 0.125
OD_SPHEQUIV: 0.375
OD_SPHEQUIV: 0.25
OD_SPHEQUIV: 0.5
OS_SPHEQUIV: 0.375
OD_SPHEQUIV: 0.5
OS_SPHEQUIV: 0.375
OS_SPHEQUIV: 0.625

## 2023-08-08 ASSESSMENT — REFRACTION_CURRENTRX
OS_SPHERE: +1.00
OD_OVR_VA: 20/
OD_AXIS: 100
OS_OVR_VA: 20/
OD_VPRISM_DIRECTION: SV
OS_VPRISM_DIRECTION: SV
OS_CYLINDER: -1.25
OS_AXIS: 080
OD_CYLINDER: -1.75
OD_SPHERE: +1.25

## 2023-08-08 ASSESSMENT — AXIALLENGTH_DERIVED
OD_AL: 23.9535
OD_AL: 24.0544
OD_AL: 24.0544
OD_AL: 23.9535
OS_AL: 24.1531
OS_AL: 23.9507
OS_AL: 24.0515
OS_AL: 24.0515
OS_AL: 23.9507
OD_AL: 24.0038

## 2023-08-08 ASSESSMENT — PACHYMETRY
OD_CT_CORRECTION: 0
OD_CT_UM: 545
OS_CT_CORRECTION: 1
OS_CT_UM: 536

## 2023-08-08 ASSESSMENT — KERATOMETRY
OD_K2POWER_DIOPTERS: 42.50
OD_K1POWER_DIOPTERS: 41.50
OS_K2POWER_DIOPTERS: 42.25
OS_K1POWER_DIOPTERS: 41.50
OD_AXISANGLE_DEGREES: 019
METHOD_AUTO_MANUAL: AUTO
OS_AXISANGLE_DEGREES: 170

## 2023-08-08 ASSESSMENT — VISUAL ACUITY
OD_BCVA: 20/20
OS_BCVA: 20/20

## 2023-08-08 ASSESSMENT — REFRACTION_AUTOREFRACTION
OS_CYLINDER: -1.25
OD_SPHERE: +1.25
OS_AXIS: 085
OD_AXIS: 103
OD_CYLINDER: -1.50
OS_SPHERE: +1.25

## 2023-08-08 ASSESSMENT — SUPERFICIAL PUNCTATE KERATITIS (SPK)
OD_SPK: 1+
OS_SPK: 1+

## 2023-08-08 ASSESSMENT — CONFRONTATIONAL VISUAL FIELD TEST (CVF)
OS_FINDINGS: FULL
OD_FINDINGS: FULL

## 2023-08-08 ASSESSMENT — TEAR BREAK UP TIME (TBUT)
OS_TBUT: 1 SEC
OD_TBUT: 1 SEC

## 2023-08-08 ASSESSMENT — TONOMETRY
OS_IOP_MMHG: 11
OD_IOP_MMHG: 14

## 2023-12-11 ENCOUNTER — NON-APPOINTMENT (OUTPATIENT)
Age: 43
End: 2023-12-11

## 2024-02-01 ENCOUNTER — APPOINTMENT (OUTPATIENT)
Dept: GASTROENTEROLOGY | Facility: CLINIC | Age: 44
End: 2024-02-01
Payer: COMMERCIAL

## 2024-02-01 VITALS
HEIGHT: 63 IN | TEMPERATURE: 97.5 F | OXYGEN SATURATION: 99 % | BODY MASS INDEX: 24.63 KG/M2 | DIASTOLIC BLOOD PRESSURE: 78 MMHG | WEIGHT: 139 LBS | SYSTOLIC BLOOD PRESSURE: 130 MMHG | HEART RATE: 67 BPM

## 2024-02-01 DIAGNOSIS — Z12.11 ENCOUNTER FOR SCREENING FOR MALIGNANT NEOPLASM OF COLON: ICD-10-CM

## 2024-02-01 DIAGNOSIS — Z80.0 ENCOUNTER FOR SCREENING FOR MALIGNANT NEOPLASM OF COLON: ICD-10-CM

## 2024-02-01 DIAGNOSIS — Z80.49 FAMILY HISTORY OF MALIGNANT NEOPLASM OF OTHER GENITAL ORGANS: ICD-10-CM

## 2024-02-01 PROCEDURE — 99203 OFFICE O/P NEW LOW 30 MIN: CPT

## 2024-02-01 RX ORDER — SODIUM PICOSULFATE, MAGNESIUM OXIDE, AND ANHYDROUS CITRIC ACID 12; 3.5; 1 G/175ML; G/175ML; MG/175ML
10-3.5-12 MG-GM LIQUID ORAL
Qty: 2 | Refills: 0 | Status: ACTIVE | COMMUNITY
Start: 2024-02-01 | End: 1900-01-01

## 2024-02-01 NOTE — REASON FOR VISIT
[Initial Evaluation] : an initial evaluation [FreeTextEntry1] : Screening colonoscopy, family history of colon cancer

## 2024-02-01 NOTE — ASSESSMENT
[FreeTextEntry1] : Patient with a grandmother with colon cancer request screening colonoscopy.  A colonoscopy has been scheduled. The risks, benefits, alternatives, and limitations of the procedure, including the possibility of missed lesions, were explained.

## 2024-02-01 NOTE — HISTORY OF PRESENT ILLNESS
[FreeTextEntry1] : The patient is a 44-year-old woman who presents for screening colonoscopy.  She is concerned because her maternal grandmother had colon cancer.  She feels well denying abdominal pain.  She has 1-2 solid bowel movements a day without melena or bright red blood per rectum.  She denies heartburn, dysphagia, nausea, vomiting.  She has gained 15 pounds.  She has never had a colonoscopy.  The patient has not been admitted to the hospital in the past year and denies any cardiac issues.

## 2024-02-01 NOTE — CONSULT LETTER
[FreeTextEntry1] : Dear Dr. Huseyin Mercedes,   I had the pleasure of seeing your patient CHANDAN GRIFFIN in the office today.  My office note is attached. PLEASE READ THE "ASSESSMENT" SECTION OF THE NOTE TO SEE MY IMPRESSION AND PLAN.   Thank you very much for allowing me to participate in the care of your patient.   Sincerely,   Don Carvalho M.D., FACG, FACP Director, Celiac Program at [facility]  of Medicine, [employer] at [facility] Adjunct  of Medicine, [facility] of Medicine Practice Director, [facility] - Gastroenterology at [address] [place] [address] Tel: [phone] Email: [email or web address]     The attached note has been created using a voice recognition system (Dragon).  There may be some misspellings and typos.  Please call my office if you have any issues or questions.

## 2024-04-16 ENCOUNTER — NON-APPOINTMENT (OUTPATIENT)
Age: 44
End: 2024-04-16

## 2024-04-17 ENCOUNTER — NON-APPOINTMENT (OUTPATIENT)
Age: 44
End: 2024-04-17

## 2024-07-02 ENCOUNTER — APPOINTMENT (OUTPATIENT)
Dept: GASTROENTEROLOGY | Facility: AMBULATORY MEDICAL SERVICES | Age: 44
End: 2024-07-02
Payer: COMMERCIAL

## 2024-07-02 PROCEDURE — 45378 DIAGNOSTIC COLONOSCOPY: CPT | Mod: PT

## 2024-07-10 ENCOUNTER — APPOINTMENT (OUTPATIENT)
Dept: ORTHOPEDIC SURGERY | Facility: CLINIC | Age: 44
End: 2024-07-10

## 2024-07-10 VITALS — HEIGHT: 63 IN | WEIGHT: 139 LBS | BODY MASS INDEX: 24.63 KG/M2

## 2024-07-10 DIAGNOSIS — E55.9 VITAMIN D DEFICIENCY, UNSPECIFIED: ICD-10-CM

## 2024-07-10 DIAGNOSIS — S93.521A SPRAIN OF METATARSOPHALANGEAL JOINT OF RIGHT GREAT TOE, INITIAL ENCOUNTER: ICD-10-CM

## 2024-07-10 DIAGNOSIS — M84.374A STRESS FRACTURE, RIGHT FOOT, INITIAL ENCOUNTER FOR FRACTURE: ICD-10-CM

## 2024-07-10 PROCEDURE — 73630 X-RAY EXAM OF FOOT: CPT | Mod: RT

## 2024-07-10 PROCEDURE — 99204 OFFICE O/P NEW MOD 45 MIN: CPT | Mod: 25

## 2024-08-21 ENCOUNTER — APPOINTMENT (OUTPATIENT)
Dept: ORTHOPEDIC SURGERY | Facility: CLINIC | Age: 44
End: 2024-08-21
Payer: COMMERCIAL

## 2024-08-21 VITALS — WEIGHT: 139 LBS | HEIGHT: 63 IN | BODY MASS INDEX: 24.63 KG/M2

## 2024-08-21 DIAGNOSIS — E55.9 VITAMIN D DEFICIENCY, UNSPECIFIED: ICD-10-CM

## 2024-08-21 DIAGNOSIS — S93.521A SPRAIN OF METATARSOPHALANGEAL JOINT OF RIGHT GREAT TOE, INITIAL ENCOUNTER: ICD-10-CM

## 2024-08-21 DIAGNOSIS — M84.374D STRESS FRACTURE, RIGHT FOOT, SUBSEQUENT ENCOUNTER FOR FRACTURE WITH ROUTINE HEALING: ICD-10-CM

## 2024-08-21 PROCEDURE — 99213 OFFICE O/P EST LOW 20 MIN: CPT

## 2024-08-21 NOTE — HISTORY OF PRESENT ILLNESS
[Sudden] : sudden [4] : 4 [Dull/Aching] : dull/aching [Radiating] : radiating [Ice] : ice [Walking] : walking [5] : 5 [Physical therapy] : physical therapy [de-identified] : 7/10/24: Patient states in 2022 she fell and injured her foot She saw a Podiatrist at that time and was diagnosed with a sprain. Taped 12 weeks later she still had pain and was treated with a CSI. She was feeling better until last November she had pain again and was treated with another CSI with good relief. Also treated with orthotics. Recently she started running to train for a marathon and started having pain again. Recently had an mri and is here for a second opinion.   History of Vitamin D Deficiency.  Not taking any supplements at this time.   8/21/24: Here for f/u R foot. Saw Podiatrist and is waiting for orthotics. Went away to [place] and did alot of walking which aggravated her pain.  [] : no [FreeTextEntry1] : rt foot [FreeTextEntry5] : March 2022 pt fell wearing boots that had slight heel and foot flexed and noticed pain and swelling a few days later ; saw a podiatrist..has had a few cortisone injections  [FreeTextEntry7] : rt toe [de-identified] : went to podiatrist yesterday to get orthotics modified/ boot [de-identified] : off for summer

## 2024-08-21 NOTE — DISCUSSION/SUMMARY
[de-identified] : Add Mortons extension to orthotic. Advised to avoid hyperextension of her toe.  cautioned on activities. no running, jumping, high impact activity.

## 2024-08-21 NOTE — REVIEW OF SYSTEMS
[Joint Pain] : joint pain [Negative] : Heme/Lymph [Joint Swelling] : joint swelling [FreeTextEntry9] : stiff at night

## 2024-09-19 ENCOUNTER — APPOINTMENT (OUTPATIENT)
Dept: OBGYN | Facility: CLINIC | Age: 44
End: 2024-09-19
Payer: COMMERCIAL

## 2024-09-19 VITALS
SYSTOLIC BLOOD PRESSURE: 114 MMHG | DIASTOLIC BLOOD PRESSURE: 74 MMHG | HEIGHT: 63 IN | WEIGHT: 139 LBS | BODY MASS INDEX: 24.63 KG/M2

## 2024-09-19 DIAGNOSIS — Z01.419 ENCOUNTER FOR GYNECOLOGICAL EXAMINATION (GENERAL) (ROUTINE) W/OUT ABNORMAL FINDINGS: ICD-10-CM

## 2024-09-19 PROCEDURE — 99459 PELVIC EXAMINATION: CPT

## 2024-09-19 PROCEDURE — 99396 PREV VISIT EST AGE 40-64: CPT

## 2024-09-19 NOTE — PHYSICAL EXAM
[Chaperone Present] : A chaperone was present in the examining room during all aspects of the physical examination [[id number]] : A chaperone was present during the pelvic exam. [Appropriately responsive] : appropriately responsive [Alert] : alert [No Acute Distress] : no acute distress [Oriented x3] : oriented x3 [Examination Of The Breasts] : a normal appearance [No Masses] : no breast masses were palpable [Labia Majora] : normal [Labia Minora] : normal [Normal] : normal [Uterine Adnexae] : absent [FreeTextEntry1] : 5 mm dark, well circumscribed, not raised lesion on right vulva, likely macule, no concerning features [FreeTextEntry4] : Grade 1 uterine prolapse noted at baseline, descent further almost but not completely down to introitus when pushing down on uterus during bimanual exam. No significant cystocele or rectocele.

## 2024-09-19 NOTE — DISCUSSION/SUMMARY
[FreeTextEntry1] : 45 y/o P4 LMP 8/28 with hx of LSC LSO, R. salpingectomy presenting for annual visit. Menses normal, but recently discovered she is mildly anemic. - Reviewed options to treat heavy menses if desired, but hgb of 10.7 is only mildly low and does not necessarily require treatment of menses if she otherwise feels well and it is not bothersome. Pt not interested in management of menses at this time. Advised if menses or hgb worsens to return for further discussion of management. - Reviewed grade 1 prolapse again, advised if not symptomatic no intervention needed - Pap/HPV NIL and negative 06/2022, due in [id number], reviewed ASCCP guidelines with patient. Normal exam today - Mammogram and breast sono (dense breasts) ordered, due in April - Normal colonoscopy this year per pt - Return for annual or sooner if needed  Johnathan San MD.

## 2024-09-19 NOTE — HISTORY OF PRESENT ILLNESS
[N] : Patient does not use contraception [Y] : Positive pregnancy history [Menarche Age: ____] : age at menarche was [unfilled] [No] : Patient does not have concerns regarding sex [Currently Active] : currently active [Men] : men [Mammogramdate] : 04/16/24 [TextBox_19] : BR 2 [BreastSonogramDate] : 04/16/24 [TextBox_25] : BR 2 [PapSmeardate] : 06/06/22 [TextBox_31] : neg [HPVDate] : 06/06/22 [TextBox_78] : neg [LMPDate] : 08/28/24 [PGHxTotal] : 4 [[facility]] : 4 [[facility]] : 4 [FreeTextEntry1] : 08/28/24

## 2024-10-10 ENCOUNTER — APPOINTMENT (OUTPATIENT)
Dept: ORTHOPEDIC SURGERY | Facility: CLINIC | Age: 44
End: 2024-10-10

## 2024-10-24 ENCOUNTER — OFFICE (OUTPATIENT)
Dept: URBAN - METROPOLITAN AREA CLINIC 104 | Facility: CLINIC | Age: 44
Setting detail: OPHTHALMOLOGY
End: 2024-10-24
Payer: COMMERCIAL

## 2024-10-24 DIAGNOSIS — H01.001: ICD-10-CM

## 2024-10-24 DIAGNOSIS — H52.03: ICD-10-CM

## 2024-10-24 DIAGNOSIS — H16.223: ICD-10-CM

## 2024-10-24 DIAGNOSIS — H40.013: ICD-10-CM

## 2024-10-24 PROBLEM — H18.513 ENDOTHELIAL CORNEAL DYSTROPHY; BOTH EYES: Status: ACTIVE | Noted: 2024-10-24

## 2024-10-24 PROCEDURE — 92133 CPTRZD OPH DX IMG PST SGM ON: CPT | Performed by: OPTOMETRIST

## 2024-10-24 PROCEDURE — 92014 COMPRE OPH EXAM EST PT 1/>: CPT | Performed by: OPTOMETRIST

## 2024-10-24 PROCEDURE — 92015 DETERMINE REFRACTIVE STATE: CPT | Performed by: OPTOMETRIST

## 2024-10-24 ASSESSMENT — VISUAL ACUITY
OD_BCVA: 20/25+1
OS_BCVA: 20/30

## 2024-10-24 ASSESSMENT — REFRACTION_AUTOREFRACTION
OD_AXIS: 107
OS_CYLINDER: -1.25
OD_SPHERE: +1.00
OS_AXIS: 089
OS_SPHERE: +0.75
OD_CYLINDER: -1.75

## 2024-10-24 ASSESSMENT — CONFRONTATIONAL VISUAL FIELD TEST (CVF)
OS_FINDINGS: FULL
OD_FINDINGS: FULL

## 2024-10-24 ASSESSMENT — REFRACTION_MANIFEST
OS_CYLINDER: -1.25
OS_VA1: 20/20
OD_VA1: 20/20
OS_SPHERE: +1.00
OS_AXIS: 085
OD_SPHERE: +1.00
OD_AXIS: 100
OD_CYLINDER: -1.50

## 2024-10-24 ASSESSMENT — SUPERFICIAL PUNCTATE KERATITIS (SPK)
OS_SPK: 1+
OD_SPK: 1+

## 2024-10-24 ASSESSMENT — KERATOMETRY
OS_AXISANGLE_DEGREES: 171
OS_K2POWER_DIOPTERS: 42.51
OS_K1POWER_DIOPTERS: 41.72
OD_K2POWER_DIOPTERS: 42.83
OD_AXISANGLE_DEGREES: 022
OD_K1POWER_DIOPTERS: 41.82

## 2024-10-24 ASSESSMENT — LID EXAM ASSESSMENTS
OD_BLEPHARITIS: RUL 1+
OS_BLEPHARITIS: LUL 1+

## 2024-11-11 ENCOUNTER — NON-APPOINTMENT (OUTPATIENT)
Age: 44
End: 2024-11-11

## 2024-11-13 ENCOUNTER — APPOINTMENT (OUTPATIENT)
Dept: UROGYNECOLOGY | Facility: CLINIC | Age: 44
End: 2024-11-13
Payer: COMMERCIAL

## 2024-11-13 VITALS
DIASTOLIC BLOOD PRESSURE: 74 MMHG | HEIGHT: 63 IN | WEIGHT: 135 LBS | SYSTOLIC BLOOD PRESSURE: 125 MMHG | BODY MASS INDEX: 23.92 KG/M2 | HEART RATE: 82 BPM

## 2024-11-13 DIAGNOSIS — N36.41 HYPERMOBILITY OF URETHRA: ICD-10-CM

## 2024-11-13 DIAGNOSIS — N81.2 INCOMPLETE UTEROVAGINAL PROLAPSE: ICD-10-CM

## 2024-11-13 DIAGNOSIS — N81.89 OTHER FEMALE GENITAL PROLAPSE: ICD-10-CM

## 2024-11-13 DIAGNOSIS — N81.6 RECTOCELE: ICD-10-CM

## 2024-11-13 DIAGNOSIS — N81.11 CYSTOCELE, MIDLINE: ICD-10-CM

## 2024-11-13 PROCEDURE — 99459 PELVIC EXAMINATION: CPT

## 2024-11-13 PROCEDURE — 99205 OFFICE O/P NEW HI 60 MIN: CPT

## 2024-11-14 DIAGNOSIS — Z87.09 PERSONAL HISTORY OF OTHER DISEASES OF THE RESPIRATORY SYSTEM: ICD-10-CM

## 2024-11-27 ENCOUNTER — APPOINTMENT (OUTPATIENT)
Dept: UROGYNECOLOGY | Facility: CLINIC | Age: 44
End: 2024-11-27

## 2024-11-27 VITALS
HEART RATE: 76 BPM | BODY MASS INDEX: 23.92 KG/M2 | HEIGHT: 63 IN | DIASTOLIC BLOOD PRESSURE: 77 MMHG | SYSTOLIC BLOOD PRESSURE: 117 MMHG | WEIGHT: 135 LBS

## 2024-11-27 DIAGNOSIS — Z80.0 FAMILY HISTORY OF MALIGNANT NEOPLASM OF DIGESTIVE ORGANS: ICD-10-CM

## 2024-11-27 DIAGNOSIS — Z83.3 FAMILY HISTORY OF DIABETES MELLITUS: ICD-10-CM

## 2024-11-27 DIAGNOSIS — Z82.3 FAMILY HISTORY OF STROKE: ICD-10-CM

## 2024-11-27 DIAGNOSIS — Z86.59 PERSONAL HISTORY OF OTHER MENTAL AND BEHAVIORAL DISORDERS: ICD-10-CM

## 2024-11-27 DIAGNOSIS — N81.9 FEMALE GENITAL PROLAPSE, UNSPECIFIED: ICD-10-CM

## 2024-11-27 LAB
BILIRUB UR QL STRIP: NEGATIVE
CLARITY UR: CLEAR
COLLECTION METHOD: NORMAL
GLUCOSE UR-MCNC: NEGATIVE
HCG UR QL: 0.2
HGB UR QL STRIP.AUTO: NEGATIVE
KETONES UR-MCNC: NEGATIVE
LEUKOCYTE ESTERASE UR QL STRIP: NEGATIVE
NITRITE UR QL STRIP: NEGATIVE
PH UR STRIP: 5.5
PROT UR STRIP-MCNC: NEGATIVE
SP GR UR STRIP: 1.03

## 2024-11-27 PROCEDURE — 81003 URINALYSIS AUTO W/O SCOPE: CPT | Mod: QW

## 2024-11-27 PROCEDURE — 51701 INSERT BLADDER CATHETER: CPT | Mod: 59

## 2024-11-27 PROCEDURE — 99214 OFFICE O/P EST MOD 30 MIN: CPT | Mod: 25

## 2024-11-27 PROCEDURE — 99459 PELVIC EXAMINATION: CPT

## 2024-12-09 ENCOUNTER — APPOINTMENT (OUTPATIENT)
Dept: UROGYNECOLOGY | Facility: CLINIC | Age: 44
End: 2024-12-09
Payer: COMMERCIAL

## 2024-12-09 VITALS — SYSTOLIC BLOOD PRESSURE: 135 MMHG | HEART RATE: 68 BPM | DIASTOLIC BLOOD PRESSURE: 77 MMHG

## 2024-12-09 DIAGNOSIS — Z46.89 ENCOUNTER FOR FITTING AND ADJUSTMENT OF OTHER SPECIFIED DEVICES: ICD-10-CM

## 2024-12-09 DIAGNOSIS — N81.6 RECTOCELE: ICD-10-CM

## 2024-12-09 DIAGNOSIS — N81.2 INCOMPLETE UTEROVAGINAL PROLAPSE: ICD-10-CM

## 2024-12-09 DIAGNOSIS — N81.11 CYSTOCELE, MIDLINE: ICD-10-CM

## 2024-12-09 PROCEDURE — A4562: CPT

## 2024-12-09 PROCEDURE — 57160 INSERT PESSARY/OTHER DEVICE: CPT

## 2024-12-09 PROCEDURE — 51798 US URINE CAPACITY MEASURE: CPT

## 2024-12-24 ENCOUNTER — APPOINTMENT (OUTPATIENT)
Dept: UROGYNECOLOGY | Facility: CLINIC | Age: 44
End: 2024-12-24

## 2025-03-12 ENCOUNTER — NON-APPOINTMENT (OUTPATIENT)
Age: 45
End: 2025-03-12

## 2025-03-12 ENCOUNTER — APPOINTMENT (OUTPATIENT)
Dept: UROGYNECOLOGY | Facility: CLINIC | Age: 45
End: 2025-03-12

## 2025-03-12 VITALS
BODY MASS INDEX: 23.92 KG/M2 | SYSTOLIC BLOOD PRESSURE: 116 MMHG | DIASTOLIC BLOOD PRESSURE: 76 MMHG | HEART RATE: 73 BPM | WEIGHT: 135 LBS | HEIGHT: 63 IN

## 2025-03-12 DIAGNOSIS — N81.2 INCOMPLETE UTEROVAGINAL PROLAPSE: ICD-10-CM

## 2025-03-12 DIAGNOSIS — Z46.89 ENCOUNTER FOR FITTING AND ADJUSTMENT OF OTHER SPECIFIED DEVICES: ICD-10-CM

## 2025-03-12 DIAGNOSIS — N81.6 RECTOCELE: ICD-10-CM

## 2025-03-12 DIAGNOSIS — N81.11 CYSTOCELE, MIDLINE: ICD-10-CM

## 2025-03-12 PROCEDURE — A4562: CPT

## 2025-03-12 PROCEDURE — 51798 US URINE CAPACITY MEASURE: CPT

## 2025-03-12 PROCEDURE — 57160 INSERT PESSARY/OTHER DEVICE: CPT

## 2025-05-12 ENCOUNTER — APPOINTMENT (OUTPATIENT)
Dept: UROGYNECOLOGY | Facility: CLINIC | Age: 45
End: 2025-05-12

## 2025-05-23 ENCOUNTER — OFFICE (OUTPATIENT)
Dept: URBAN - METROPOLITAN AREA CLINIC 115 | Facility: CLINIC | Age: 45
Setting detail: OPHTHALMOLOGY
End: 2025-05-23
Payer: COMMERCIAL

## 2025-05-23 DIAGNOSIS — H52.4: ICD-10-CM

## 2025-05-23 DIAGNOSIS — H40.013: ICD-10-CM

## 2025-05-23 DIAGNOSIS — H01.001: ICD-10-CM

## 2025-05-23 DIAGNOSIS — H18.513: ICD-10-CM

## 2025-05-23 DIAGNOSIS — H04.123: ICD-10-CM

## 2025-05-23 PROCEDURE — 92014 COMPRE OPH EXAM EST PT 1/>: CPT | Performed by: OPTOMETRIST

## 2025-05-23 PROCEDURE — 92250 FUNDUS PHOTOGRAPHY W/I&R: CPT | Performed by: OPTOMETRIST

## 2025-05-23 PROCEDURE — 92015 DETERMINE REFRACTIVE STATE: CPT | Performed by: OPTOMETRIST

## 2025-05-23 ASSESSMENT — VISUAL ACUITY
OD_BCVA: 20/20
OS_BCVA: 20/20

## 2025-05-23 ASSESSMENT — REFRACTION_CURRENTRX
OS_CYLINDER: -1.25
OS_VPRISM_DIRECTION: SV
OD_AXIS: 095
OS_OVR_VA: 20/
OD_CYLINDER: -1.75
OD_VPRISM_DIRECTION: SV
OS_AXIS: 073
OD_SPHERE: +1.25
OS_SPHERE: +1.00
OD_OVR_VA: 20/

## 2025-05-23 ASSESSMENT — REFRACTION_MANIFEST
OD_AXIS: 106
OS_VA1: 20/20
OD_SPHERE: +1.00
OD_VA1: 20/20
OS_SPHERE: +0.75
OS_ADD: +1.25
OD_VA1: 20/20
OS_CYLINDER: -1.25
OD_CYLINDER: -1.50
OS_AXIS: 085
OD_CYLINDER: -1.50
OD_AXIS: 100
OD_SPHERE: +1.00
OS_CYLINDER: -1.25
OS_SPHERE: +1.00
OS_AXIS: 085
OS_VA1: 20/20
OU_VA: 20/20
OD_ADD: +1.25

## 2025-05-23 ASSESSMENT — PACHYMETRY
OD_CT_CORRECTION: 0
OD_CT_UM: 545
OS_CT_UM: 536
OS_CT_CORRECTION: 1

## 2025-05-23 ASSESSMENT — KERATOMETRY
OS_AXISANGLE_DEGREES: 171
OS_K2POWER_DIOPTERS: 42.51
OD_AXISANGLE_DEGREES: 022
OD_K1POWER_DIOPTERS: 41.82
OS_K1POWER_DIOPTERS: 41.72
OD_K2POWER_DIOPTERS: 42.83

## 2025-05-23 ASSESSMENT — REFRACTION_AUTOREFRACTION
OD_CYLINDER: -1.75
OS_SPHERE: +0.75
OS_AXIS: 085
OS_CYLINDER: -1.25
OD_SPHERE: +0.75
OD_AXIS: 106

## 2025-05-23 ASSESSMENT — LID EXAM ASSESSMENTS
OD_BLEPHARITIS: RUL 1+
OS_BLEPHARITIS: LUL 1+

## 2025-05-23 ASSESSMENT — SUPERFICIAL PUNCTATE KERATITIS (SPK)
OD_SPK: 3+
OS_SPK: 3+

## 2025-05-23 ASSESSMENT — TONOMETRY: OD_IOP_MMHG: 10

## 2025-05-23 ASSESSMENT — INCREASING TEAR LAKE - SEVERITY SCORE
OS_INC_TEARLAKE: 3+
OD_INC_TEARLAKE: 3+

## 2025-05-23 ASSESSMENT — CONFRONTATIONAL VISUAL FIELD TEST (CVF)
OD_FINDINGS: FULL
OS_FINDINGS: FULL

## 2025-06-17 ENCOUNTER — NON-APPOINTMENT (OUTPATIENT)
Age: 45
End: 2025-06-17

## 2025-07-08 ENCOUNTER — NON-APPOINTMENT (OUTPATIENT)
Age: 45
End: 2025-07-08

## 2025-07-09 ENCOUNTER — NON-APPOINTMENT (OUTPATIENT)
Age: 45
End: 2025-07-09

## (undated) DEVICE — BLADE SURGICAL #15 CARBON

## (undated) DEVICE — SOL IRR BAG NS 0.9% 3000ML

## (undated) DEVICE — SUT VICRYL 0 27" CT-2 UNDYED

## (undated) DEVICE — TUBING STRYKEFLOW II SUCTION / IRRIGATOR

## (undated) DEVICE — FOLEY TRAY 16FR 5CC LF UMETER CLOSED

## (undated) DEVICE — SUT VLOC 180 0 9" GS-21 GREEN

## (undated) DEVICE — SYR LUER LOK 10CC

## (undated) DEVICE — SYR CATH TIP 2 OZ

## (undated) DEVICE — UTERINE MANIPULATOR CONMED VCARE SM 32MM

## (undated) DEVICE — SYR CONTROL LUER LOK 10CC

## (undated) DEVICE — PACK GENERAL LAPAROSCOPY

## (undated) DEVICE — ELCTR CORD FOOTSWITCH 1PLR LAPSCP 10FT

## (undated) DEVICE — VENODYNE/SCD SLEEVE CALF MEDIUM

## (undated) DEVICE — TUBING INSUFFLATION LAP FILTER 10FT

## (undated) DEVICE — SUT VICRYL PLUS 4-0 18" PS-2 UNDYED

## (undated) DEVICE — WARMING BLANKET UPPER ADULT

## (undated) DEVICE — POSITIONER PINK PAD PIGAZZI SYSTEM

## (undated) DEVICE — SUT VLOC 180 0 6" GS-21 GREEN

## (undated) DEVICE — UTERINE MANIPULATOR CONMED VCARE MED 34MM

## (undated) DEVICE — GLV 7.5 PROTEXIS (WHITE)

## (undated) DEVICE — TROCAR COVIDIEN VERSAPORT BLADELESS OPTICAL 5MM STANDARD

## (undated) DEVICE — DRAPE TOWEL BLUE 17" X 24"

## (undated) DEVICE — LIGASURE BLUNT TIP 37CM

## (undated) DEVICE — UTERINE MANIPULATOR CONMED VCARE LG 37MM

## (undated) DEVICE — TUBING IRR SET FOR CYSTOSCOPY 77"

## (undated) DEVICE — DRAPE LAVH 124" X 30" X125"

## (undated) DEVICE — INSUFFLATION NDL COVIDIEN STEP 14G FOR STEP/VERSASTEP

## (undated) DEVICE — TROCAR COVIDIEN VERSAPORT BLADELESS OPTICAL 11MM STANDARD